# Patient Record
Sex: MALE | Race: BLACK OR AFRICAN AMERICAN | NOT HISPANIC OR LATINO | Employment: FULL TIME | URBAN - METROPOLITAN AREA
[De-identification: names, ages, dates, MRNs, and addresses within clinical notes are randomized per-mention and may not be internally consistent; named-entity substitution may affect disease eponyms.]

---

## 2018-02-02 ENCOUNTER — OFFICE VISIT (OUTPATIENT)
Dept: INTERNAL MEDICINE | Facility: CLINIC | Age: 33
End: 2018-02-02
Payer: COMMERCIAL

## 2018-02-02 ENCOUNTER — TELEPHONE (OUTPATIENT)
Dept: INTERNAL MEDICINE | Facility: CLINIC | Age: 33
End: 2018-02-02

## 2018-02-02 VITALS
HEIGHT: 67 IN | SYSTOLIC BLOOD PRESSURE: 120 MMHG | HEART RATE: 82 BPM | WEIGHT: 153.44 LBS | DIASTOLIC BLOOD PRESSURE: 72 MMHG | OXYGEN SATURATION: 99 % | TEMPERATURE: 99 F | BODY MASS INDEX: 24.08 KG/M2

## 2018-02-02 DIAGNOSIS — R68.89 FLU-LIKE SYMPTOMS: ICD-10-CM

## 2018-02-02 DIAGNOSIS — J20.9 BRONCHITIS, ACUTE, WITH BRONCHOSPASM: Primary | ICD-10-CM

## 2018-02-02 LAB
FLUAV AG SPEC QL IA: NEGATIVE
FLUBV AG SPEC QL IA: NEGATIVE
SPECIMEN SOURCE: NORMAL

## 2018-02-02 PROCEDURE — 99999 PR PBB SHADOW E&M-EST. PATIENT-LVL III: CPT | Mod: PBBFAC,,, | Performed by: FAMILY MEDICINE

## 2018-02-02 PROCEDURE — 3008F BODY MASS INDEX DOCD: CPT | Mod: S$GLB,,, | Performed by: FAMILY MEDICINE

## 2018-02-02 PROCEDURE — 87400 INFLUENZA A/B EACH AG IA: CPT | Mod: PO

## 2018-02-02 PROCEDURE — 99214 OFFICE O/P EST MOD 30 MIN: CPT | Mod: S$GLB,,, | Performed by: FAMILY MEDICINE

## 2018-02-02 RX ORDER — DOXYCYCLINE 100 MG/1
100 CAPSULE ORAL 2 TIMES DAILY
Qty: 20 CAPSULE | Refills: 0 | Status: SHIPPED | OUTPATIENT
Start: 2018-02-02 | End: 2018-02-12

## 2018-02-02 RX ORDER — ALBUTEROL SULFATE 90 UG/1
2 AEROSOL, METERED RESPIRATORY (INHALATION) EVERY 6 HOURS PRN
Qty: 1 INHALER | Refills: 0 | Status: SHIPPED | OUTPATIENT
Start: 2018-02-02 | End: 2018-02-08 | Stop reason: CLARIF

## 2018-02-02 NOTE — TELEPHONE ENCOUNTER
----- Message from Marvin Mcintyre MD sent at 2/2/2018 11:55 AM CST -----  Influenza testing negative.  Sent additional prescription for antibiotic to his pharmacy.

## 2018-02-02 NOTE — PROGRESS NOTES
Subjective:   Patient ID: Desean Kent is a 33 y.o. male.  Chief Complaint:  Cough; Shortness of Breath; Generalized Body Aches; and Fever    Cough   This is a new problem. The current episode started in the past 7 days. The problem has been unchanged. The problem occurs every few minutes. The cough is non-productive. Associated symptoms include chills, a fever, myalgias, nasal congestion, postnasal drip, rhinorrhea and shortness of breath. Pertinent negatives include no chest pain, ear congestion, ear pain, eye redness, headaches, heartburn, hemoptysis, rash, sore throat, sweats, weight loss or wheezing. Nothing aggravates the symptoms. He has tried nothing for the symptoms. There is no history of asthma, bronchitis, environmental allergies or pneumonia.   Influenza   The current episode started in the past 7 days. The problem occurs constantly. The problem has been gradually worsening. Associated symptoms include chills, congestion, coughing, diaphoresis, a fever, myalgias and nausea. Pertinent negatives include no abdominal pain, anorexia, arthralgias, change in bowel habit, chest pain, fatigue, headaches, joint swelling, neck pain, numbness, rash, sore throat, swollen glands, urinary symptoms, vertigo, visual change, vomiting or weakness. Nothing aggravates the symptoms. He has tried acetaminophen and NSAIDs for the symptoms. The treatment provided moderate relief.       Current Outpatient Prescriptions:     albuterol 90 mcg/actuation inhaler, Inhale 2 puffs into the lungs every 6 (six) hours as needed for Wheezing or Shortness of Breath (or Cough)., Disp: 1 Inhaler, Rfl: 0    doxycycline (VIBRAMYCIN) 100 MG Cap, Take 1 capsule (100 mg total) by mouth 2 (two) times daily., Disp: 20 capsule, Rfl: 0     Review of Systems   Constitutional: Positive for chills, diaphoresis and fever. Negative for fatigue and weight loss.   HENT: Positive for congestion, postnasal drip and rhinorrhea. Negative for ear discharge,  "ear pain, sinus pain, sinus pressure, sneezing, sore throat, trouble swallowing and voice change.    Eyes: Negative for discharge, redness and itching.   Respiratory: Positive for cough and shortness of breath. Negative for hemoptysis, choking, chest tightness, wheezing and stridor.    Cardiovascular: Negative for chest pain, palpitations and leg swelling.   Gastrointestinal: Positive for nausea. Negative for abdominal pain, anorexia, change in bowel habit, diarrhea, heartburn and vomiting.   Musculoskeletal: Positive for myalgias. Negative for arthralgias, joint swelling and neck pain.   Skin: Negative for rash.   Allergic/Immunologic: Negative for environmental allergies.   Neurological: Negative for dizziness, vertigo, weakness, numbness and headaches.   Hematological: Negative for adenopathy.     Objective:   /72 (BP Location: Right arm, Patient Position: Sitting, BP Method: Large (Manual))   Pulse 82   Temp 98.8 °F (37.1 °C) (Tympanic)   Ht 5' 7" (1.702 m)   Wt 69.6 kg (153 lb 7 oz)   SpO2 99%   BMI 24.03 kg/m²     Physical Exam   Constitutional: Vital signs are normal. He appears well-developed and well-nourished.  Non-toxic appearance. He does not have a sickly appearance. He appears ill. No distress.   HENT:   Right Ear: Hearing, tympanic membrane, external ear and ear canal normal.   Left Ear: Hearing, tympanic membrane, external ear and ear canal normal.   Nose: Mucosal edema and rhinorrhea present. Right sinus exhibits no maxillary sinus tenderness and no frontal sinus tenderness. Left sinus exhibits no maxillary sinus tenderness and no frontal sinus tenderness.   Mouth/Throat: Uvula is midline and mucous membranes are normal. Posterior oropharyngeal edema and posterior oropharyngeal erythema present. No oropharyngeal exudate or tonsillar abscesses. Tonsils are 2+ on the right. Tonsils are 2+ on the left. No tonsillar exudate.   Eyes: Conjunctivae and lids are normal. Right eye exhibits no " discharge and no exudate. Left eye exhibits no discharge and no exudate.   Neck: Neck supple.   Cardiovascular: Normal rate, regular rhythm and normal heart sounds.  Exam reveals no gallop and no friction rub.    No murmur heard.  Pulmonary/Chest: Effort normal. No respiratory distress. He has no decreased breath sounds. He has no wheezes. He has rhonchi in the right middle field, the right lower field, the left middle field and the left lower field. He has no rales.   Abdominal: Soft. He exhibits no distension. There is no tenderness.   Lymphadenopathy:     He has no cervical adenopathy.   Skin: Skin is warm, dry and intact. No rash noted.   Psychiatric: He has a normal mood and affect.   Nursing note and vitals reviewed.    Assessment:     1. Bronchitis, acute, with bronchospasm    2. Flu-like symptoms      Plan:   Bronchitis, acute, with bronchospasm  Flu-like symptoms  -     doxycycline (VIBRAMYCIN) 100 MG Cap; Take 1 capsule (100 mg total) by mouth 2 (two) times daily.  Dispense: 20 capsule; Refill: 0  -     Influenza antigen Nasopharyngeal Swab  -     albuterol 90 mcg/actuation inhaler; Inhale 2 puffs into the lungs every 6 (six) hours as needed for Wheezing or Shortness of Breath (or Cough).  Dispense: 1 Inhaler; Refill: 0    Influenza testing negative.  Symptoms 7-10+ days.  Gradual worsening.  Treat with doxycycline for atypical bronchitis.  Albuterol for symptoms.  Tylenol/Motrin for fever or aches and pains.    Follow-up with PCP as scheduled/needed.

## 2018-02-07 ENCOUNTER — TELEPHONE (OUTPATIENT)
Dept: INTERNAL MEDICINE | Facility: CLINIC | Age: 33
End: 2018-02-07

## 2018-02-08 RX ORDER — PROMETHAZINE HYDROCHLORIDE AND DEXTROMETHORPHAN HYDROBROMIDE 6.25; 15 MG/5ML; MG/5ML
5 SYRUP ORAL EVERY 6 HOURS PRN
Qty: 180 ML | Refills: 0 | Status: SHIPPED | OUTPATIENT
Start: 2018-02-08 | End: 2018-02-18

## 2019-09-24 ENCOUNTER — OFFICE VISIT (OUTPATIENT)
Dept: INTERNAL MEDICINE | Facility: CLINIC | Age: 34
End: 2019-09-24
Payer: COMMERCIAL

## 2019-09-24 VITALS
HEIGHT: 67 IN | WEIGHT: 146.19 LBS | OXYGEN SATURATION: 99 % | TEMPERATURE: 97 F | BODY MASS INDEX: 22.94 KG/M2 | DIASTOLIC BLOOD PRESSURE: 78 MMHG | HEART RATE: 72 BPM | SYSTOLIC BLOOD PRESSURE: 124 MMHG

## 2019-09-24 DIAGNOSIS — M54.50 ACUTE BILATERAL LOW BACK PAIN WITHOUT SCIATICA: Primary | ICD-10-CM

## 2019-09-24 PROCEDURE — 3008F PR BODY MASS INDEX (BMI) DOCUMENTED: ICD-10-PCS | Mod: CPTII,S$GLB,, | Performed by: PHYSICIAN ASSISTANT

## 2019-09-24 PROCEDURE — 99214 PR OFFICE/OUTPT VISIT, EST, LEVL IV, 30-39 MIN: ICD-10-PCS | Mod: 25,S$GLB,, | Performed by: PHYSICIAN ASSISTANT

## 2019-09-24 PROCEDURE — 96372 THER/PROPH/DIAG INJ SC/IM: CPT | Mod: S$GLB,,, | Performed by: PHYSICIAN ASSISTANT

## 2019-09-24 PROCEDURE — 99214 OFFICE O/P EST MOD 30 MIN: CPT | Mod: 25,S$GLB,, | Performed by: PHYSICIAN ASSISTANT

## 2019-09-24 PROCEDURE — 96372 PR INJECTION,THERAP/PROPH/DIAG2ST, IM OR SUBCUT: ICD-10-PCS | Mod: S$GLB,,, | Performed by: PHYSICIAN ASSISTANT

## 2019-09-24 PROCEDURE — 3008F BODY MASS INDEX DOCD: CPT | Mod: CPTII,S$GLB,, | Performed by: PHYSICIAN ASSISTANT

## 2019-09-24 PROCEDURE — 99999 PR PBB SHADOW E&M-EST. PATIENT-LVL III: CPT | Mod: PBBFAC,,, | Performed by: PHYSICIAN ASSISTANT

## 2019-09-24 PROCEDURE — 99999 PR PBB SHADOW E&M-EST. PATIENT-LVL III: ICD-10-PCS | Mod: PBBFAC,,, | Performed by: PHYSICIAN ASSISTANT

## 2019-09-24 RX ORDER — KETOROLAC TROMETHAMINE 30 MG/ML
60 INJECTION, SOLUTION INTRAMUSCULAR; INTRAVENOUS
Status: COMPLETED | OUTPATIENT
Start: 2019-09-24 | End: 2019-09-24

## 2019-09-24 RX ORDER — NAPROXEN 500 MG/1
500 TABLET ORAL 2 TIMES DAILY WITH MEALS
Qty: 14 TABLET | Refills: 0 | Status: SHIPPED | OUTPATIENT
Start: 2019-09-24 | End: 2019-10-01

## 2019-09-24 RX ADMIN — KETOROLAC TROMETHAMINE 60 MG: 30 INJECTION, SOLUTION INTRAMUSCULAR; INTRAVENOUS at 09:09

## 2019-09-24 NOTE — PROGRESS NOTES
Subjective:      Patient ID: Desean Kent is a 34 y.o. male.    Chief Complaint: Back Pain (lower x 2wks)    Back Pain   This is a new problem. Episode onset: 2 weeks. The problem occurs intermittently. The problem has been waxing and waning since onset. The quality of the pain is described as aching. The pain does not radiate. The pain is moderate. The pain is worse during the night. The symptoms are aggravated by sitting and lying down. Stiffness is present in the morning. Pertinent negatives include no abdominal pain, bladder incontinence, bowel incontinence, chest pain, dysuria, fever, headaches, leg pain, numbness, paresis, paresthesias, pelvic pain, perianal numbness, tingling, weakness or weight loss. He has tried NSAIDs and heat for the symptoms. The treatment provided mild relief.       Review of Systems   Constitutional: Negative for activity change, appetite change, chills, diaphoresis, fatigue, fever, unexpected weight change and weight loss.   HENT: Negative.  Negative for congestion, hearing loss, postnasal drip, rhinorrhea, sore throat, trouble swallowing and voice change.    Eyes: Negative.  Negative for visual disturbance.   Respiratory: Negative.  Negative for cough, choking, chest tightness and shortness of breath.    Cardiovascular: Negative for chest pain, palpitations and leg swelling.   Gastrointestinal: Negative for abdominal distention, abdominal pain, blood in stool, bowel incontinence, constipation, diarrhea, nausea and vomiting.   Endocrine: Negative for cold intolerance, heat intolerance, polydipsia and polyuria.   Genitourinary: Negative.  Negative for bladder incontinence, difficulty urinating, dysuria, frequency and pelvic pain.   Musculoskeletal: Positive for back pain. Negative for arthralgias, gait problem, joint swelling and myalgias.   Skin: Negative for color change, pallor, rash and wound.   Neurological: Negative for dizziness, tingling, tremors, weakness, light-headedness,  "numbness, headaches and paresthesias.   Hematological: Negative for adenopathy.   Psychiatric/Behavioral: Negative for behavioral problems, confusion, self-injury, sleep disturbance and suicidal ideas. The patient is not nervous/anxious.      Objective:   /78 (BP Location: Left arm, Patient Position: Sitting, BP Method: Medium (Manual))   Pulse 72   Temp 96.7 °F (35.9 °C) (Tympanic)   Ht 5' 7" (1.702 m)   Wt 66.3 kg (146 lb 2.6 oz)   SpO2 99%   BMI 22.89 kg/m²     Physical Exam   Constitutional: He is oriented to person, place, and time. He appears well-developed and well-nourished. No distress.   HENT:   Head: Normocephalic and atraumatic.   Eyes: Pupils are equal, round, and reactive to light. Conjunctivae and EOM are normal.   Cardiovascular: Normal rate, regular rhythm and normal heart sounds. Exam reveals no gallop and no friction rub.   No murmur heard.  Pulmonary/Chest: Effort normal and breath sounds normal. No respiratory distress. He has no wheezes. He has no rales. He exhibits no tenderness.   Musculoskeletal: He exhibits no deformity.        Lumbar back: He exhibits decreased range of motion, tenderness, bony tenderness, pain and spasm. He exhibits no swelling, no edema, no deformity, no laceration and normal pulse.   Neurological: He is alert and oriented to person, place, and time.   Skin: Skin is warm and dry. No rash noted. He is not diaphoretic.   Psychiatric: He has a normal mood and affect. His behavior is normal. Judgment and thought content normal.   Nursing note and vitals reviewed.      Assessment:     1. Acute bilateral low back pain without sciatica      Plan:   Acute bilateral low back pain without sciatica  -     ketorolac injection 60 mg  -     naproxen (NAPROSYN) 500 MG tablet; Take 1 tablet (500 mg total) by mouth 2 (two) times daily with meals. for 7 days  Dispense: 14 tablet; Refill: 0      -start naprosyn tomorrow    -ok to take tylenol. No NSAIDS   Educational handout on " over-the-counter medications and at-home conservative care, pertinent to the patients diagnosis today, was handed to the patient and discussed in detail.    Follow up if symptoms worsen or fail to improve.

## 2019-09-24 NOTE — PATIENT INSTRUCTIONS
Self-Care for Low Back Pain    Most people have low back pain now and then. In many cases, it isnt serious and self-care can help. Sometimes low back pain can be a sign of a bigger problem. Call your healthcare provider if your pain returns often or gets worse over time. For the long-term care of your back, get regular exercise, lose any excess weight and learn good posture.  Take a short rest  Lying down during the day may be beneficial for short periods of time if severe pain increases with sitting or standing. Long-term bed rest could be detrimental.  Reduce pain and swelling  Cold reduces swelling. Both cold and heat can reduce pain. Protect your skin by placing a towel between your body and the ice or heat source.  · For the first few days, apply an ice pack for 15 to 20 minutes .  · After the first few days, try heat for 15 minutes at a time to ease pain. Never sleep on a heating pad.  · Over-the-counter medicine can help control pain and swelling. Try aspirin or ibuprofen.  Exercise  Exercise can help your back heal. It also helps your back get stronger and more flexible, preventing any reinjury. Ask your healthcare provider about specific exercises for your back.  Use good posture to avoid reinjury  · When moving, bend at the hips and knees. Dont bend at the waist or twist around.  · When lifting, keep the object close to your body. Dont try to lift more than you can handle.  · When sitting, keep your lower back supported. Use a rolled-up towel as needed.  Seek immediate medical care if:  · Youre unable to stand or walk.  · You have a temperature over 100.4°F (38.0°C)  · You have frequent, painful, or bloody urination.  · You have severe abdominal pain.  · You have a sharp, stabbing pain.  · Your pain is constant.  · You have pain or numbness in your leg.  · You feel pain in a new area of your back.  · You notice that the pain isnt decreasing after more than a week.   Date Last Reviewed: 9/29/2015  ©  0464-1835 [employer]. [address] [place] [address]. All rights reserved. This information is not intended as a substitute for professional medical care. Always follow your healthcare professional's instructions.        Relieving Back Pain  Back pain is a common problem. You can strain back muscles by lifting too much weight or just by moving the wrong way. Back strain can be uncomfortable, even painful. And it can take weeks or months to improve. To help yourself feel better and prevent future back strains, try these tips.  Important Note: Do not give aspirin to children or teens without first discussing it with your healthcare provider.      ? Ice    Ice reduces muscle pain and swelling. It helps most during the first 24 to 48 hours after an injury.  · Wrap an ice pack or a bag of frozen peas in a thin towel. (Never place ice directly on your skin.)  · Place the ice where your back hurts the most.  · Dont ice for more than 20 minutes at a time.  · You can use ice several times a day.  ? Medicines  Over-the-counter pain relievers can include acetaminophen and anti-inflammatory medicines, which includes aspirin or ibuprofen. They can help ease discomfort. Some also reduce swelling.  · Tell your healthcare provider about any medicines you are already taking.  · Take medicines only as directed.  ? Heat  After the first 48 hours, heat can relax sore muscles and improve blood flow.  · Try a warm bath or shower. Or use a heating pad set on low. To prevent a burn, keep a cloth between you and the heating pad.  · Dont use a heating pad for more than 15 minutes at a time. Never sleep on a heating pad.  Date Last Reviewed: 9/1/2015  © 6069-7950 [employer]. [address] [place] [address]. All rights reserved. This information is not intended as a substitute for professional medical care. Always follow your healthcare professional's instructions.        Back Spasm (No  Trauma)    Spasm of the back muscles can occur after a sudden forceful twisting or bending force (such as in a car accident), after a simple awkward movement, or after lifting something heavy with poor body positioning. In any case, muscle spasm adds to the pain. Sleeping in an awkward position or on a poor quality mattress can also cause this. Some people respond to emotional stress by tensing the muscles of their back.  Pain that continues may need further evaluation or other types of treatment such as physical therapy.  You don't always need X-rays for the initial evaluation of back pain, unless you had a physical injury such as from a car accident or fall. If your pain continues and doesn't respond to medical treatment, X-rays and other tests may then be done.   Home care  · As soon as possible, start sitting or walking again to avoid problems from prolonged bed rest (muscle weakness, worsening back stiffness and pain, blood clots in the legs).  · When in bed, try to find a position of comfort. A firm mattress is best. Try lying flat on your back with pillows under your knees. You can also try lying on your side with your knees bent up toward your chest and a pillow between your knees.  · Avoid prolonged sitting, long car rides, or travel. This puts more stress on the lower back than standing or walking.   · During the first 24 to 72 hours after an injury or flare-up, apply an ice pack to the painful area for 20 minutes, then remove it for 20 minutes. Do this over a period of 60 to 90 minutes or several times a day. This will reduce swelling and pain. Always wrap ice packs in a thin towel.  · You can start with ice, then switch to heat. Heat (hot shower, hot bath, or heating pad) reduces pain, and works well for muscle spasms. Apply heat to the painful area for 20 minutes, then remove it for 20 minutes. Do this over a period of 60 to 90 minutes or several times a day. Do not sleep on a heating pad as it can burn  or damage skin.  · Alternate ice and heat therapies.  · Be aware of safe lifting methods and do not lift anything over 15 pounds until all the pain is gone.  Gentle stretching will help your back heal faster. Do this simple routine 2 to 3 times a day until your back is feeling better.  · Lie on your back with your knees bent and both feet on the ground  · Slowly raise your left knee to your chest as you flatten your lower back against the floor. Hold for 20 to 30 seconds.  · Relax and repeat the exercise with your right knee.  · Do 2 to 3 of these exercises for each leg.  · Repeat, hugging both knees to your chest at the same time.  · Do not bounce, but use a gentle pull.  Medicines  Talk to your doctor before using medicine, especially if you have other medical problems or are taking other medicines.  You may use acetaminophen or ibuprofen to control pain, unless your healthcare provider prescribed another pain medicine. If you have a chronic condition such as diabetes, liver or kidney disease, stomach ulcer, or gastrointestinal bleeding, or are taking blood thinners, talk with your healthcare provider before taking any medicines.  Be careful if you are given prescription pain medicine, narcotics, or medicine for muscle spasm. They can cause drowsiness, affect your coordination, reflexes, or judgment. Do not drive or operate heavy machinery when taking these medicines. Take pain medicine only as prescribed by your healthcare provider.  Follow-up care  Follow up with your doctor, or as advised. Physical therapy or further tests may be needed.  If X-rays were taken, they may be reviewed by a radiologist. You will be notified of any new findings that may affect your care.  Call 911  Seek emergency medical care if any of these occur:  · Trouble breathing  · Confusion  · Drowsiness or trouble awakening  · Fainting or loss of consciousness  · Rapid or very slow heart rate  · Loss of bowel or bladder control  When to seek  medical advice  Call your healthcare provider right away if any of these occur:  · Pain becomes worse or spreads to your legs  · Weakness or numbness in one or both legs  · Numbness in the groin or genital area  · Unexplained fever over 100.4ºF (38.0ºC)  · Burning or pain when passing urine  Date Last Reviewed: 6/1/2016 © 2000-2017 [employer]. [address] [place] [address], [place] [address]. All rights reserved. This information is not intended as a substitute for professional medical care. Always follow your healthcare professional's instructions.        Back Care Tips    Caring for your back  These are things you can do to prevent a recurrence of acute back pain and to reduce symptoms from chronic back pain:  · Maintain a healthy weight. If you are overweight, losing weight will help most types of back pain.  · Exercise is an important part of recovery from most types of back pain. The muscles behind and in front of the spine support the back. This means strengthening both the back muscles and the abdominal muscles will provide better support for your spine.   · Swimming and brisk walking are good overall exercises to improve your fitness level.  · Practice safe lifting methods (below).  · Practice good posture when sitting, standing and walking. Avoid prolonged sitting. This puts more stress on the lower back than standing or walking.  · Wear quality shoes with sufficient arch support. Foot and ankle alignment can affect back symptoms. Women should avoid wearing high heels.  · Therapeutic massage can help relax the back muscles without stretching them.  · During the first 24 to 72 hours after an acute injury or flare-up of chronic back pain, apply an ice pack to the painful area for 20 minutes and then remove it for 20 minutes, over a period of 60 to 90 minutes, or several times a day. As a safety precaution, do not use a heating pad at bedtime. Sleeping on a heating pad can lead to skin burns or tissue  damage.  · You can alternate ice and heat therapies.  Medications  Talk to your healthcare provider before using medicines, especially if you have other medical problems or are taking other medicines.  · You may use acetaminophen or ibuprofen to control pain, unless your healthcare provider prescribed other pain medicine. If you have chronic conditions like diabetes, liver or kidney disease, stomach ulcers, or gastrointestinal bleeding, or are taking blood thinners, talk with your healthcare provider before taking any medicines.  · Be careful if you are given prescription pain medicines, narcotics, or medicine for muscle spasm. They can cause drowsiness, affect your coordination, reflexes, and judgment. Do not drive or operate heavy machinery while taking these types of medicines. Take prescription pain medicine only as prescribed by your healthcare provider.  Lumbar stretch  Here is a simple stretching exercise that will help relax muscle spasm and keep your back more limber. If exercise makes your back pain worse, dont do it.  · Lie on your back with your knees bent and both feet on the ground.  · Slowly raise your left knee to your chest as you flatten your lower back against the floor. Hold for 5 seconds.  · Relax and repeat the exercise with your right knee.  · Do 10 of these exercises for each leg.  Safe lifting method  · Dont bend over at the waist to lift an object off the floor.  Instead, bend your knees and hips in a squat.   · Keep your back and head upright  · Hold the object close to your body, directly in front of you.  · Straighten your legs to lift the object.   · Lower the object to the floor in the reverse fashion.  · If you must slide something across the floor, push it.  Posture tips  Sitting  Sit in chairs with straight backs or low-back support. Keep your knees lower than your hips, with your feet flat on the floor.  When driving, sit up straight. Adjust the seat forward so you are not  leaning toward the steering wheel.  A small pillow or rolled towel behind your lower back may help if you are driving long distances.   Standing  When standing for long periods, shift most of your weight to one leg at a time. Alternate legs every few minutes.   Sleeping  The best way to sleep is on your side with your knees bent. Put a low pillow under your head to support your neck in a neutral spine position. Avoid thick pillows that bend your neck to one side. Put a pillow between your legs to further relax your lower back. If you sleep on your back, put pillows under your knees to support your legs in a slightly flexed position. Use a firm mattress. If your mattress sags, replace it, or use a 1/2-inch plywood board under the mattress to add support.  Follow-up care  Follow up with your healthcare provider, or as advised.  If X-rays, a CT scan or an MRI scan were taken, they will be reviewed by a radiologist. You will be notified of any new findings that may affect your care.  Call 911  Seek emergency medical care if any of the following occur:  · Trouble breathing  · Confusion  · Very drowsy  · Fainting or loss of consciousness  · Rapid or very slow heart rate  · Loss of  bowel or bladder control  When to seek medical care  Call your healthcare provider if any of the following occur:  · Pain becomes worse or spreads to your arms or legs  · Weakness or numbness in one or both arms or legs  · Numbness in the groin area  Date Last Reviewed: 6/1/2016  © 4688-6822 [employer]. [address] [place] [address], [place] [address]. All rights reserved. This information is not intended as a substitute for professional medical care. Always follow your healthcare professional's instructions.        Exercises to Strengthen Your Lower Back  Strong lower back and abdominal muscles work together to support your spine. The exercises below will help strengthen the lower back. It is important that you begin exercising slowly  and increase levels gradually.  Always begin any exercise program with stretching. If you feel pain while doing any of these exercises, stop and talk to your doctor about a more specific exercise program that better suits your condition.   Low back stretch  The point of stretching is to make you more flexible and increase your range of motion. Stretch only as much as you are able. Stretch slowly. Do not push your stretch to the limit. If at any point you feel pain while stretching, this is your (temporary) limit.  · Lie on your back with your knees bent and both feet on the ground.  · Slowly raise your left knee to your chest as you flatten your lower back against the floor. Hold for 5 seconds.  · Relax and repeat the exercise with your right knee.  · Do 10 of these exercises for each leg.  · Repeat hugging both knees to your chest at the same time.  Building lower back strength  Start your exercise routine with 10 to 30 minutes a day, 1 to 3 times a day.  Initial exercises  Lying on your back:  1. Ankle pumps: Move your foot up and down, towards your head, and then away. Repeat 10 times with each foot.  2. Heel slides: Slowly bend your knee, drawing the heel of your foot towards you. Then slide your heel/foot from you, straightening your knee. Do not lift your foot off the floor (this is not a leg lift).  3. Abdominal contraction: Bend your knees and put your hands on your stomach. Tighten your stomach muscles. Hold for 5 seconds, then relax. Repeat 10 times.  4. Straight leg raise: Bend one leg at the knee and keep the other leg straight. Tighten your stomach muscles. Slowly lift your straight leg 6 to 12 inches off the floor and hold for up to 5 seconds. Repeat 10 times on each side.  Standin. Wall squats: Stand with your back against the wall. Move your feet about 12 inches away from the wall. Tighten your stomach muscles, and slowly bend your knees until they are at about a 45 degree angle. Do not go down  too far. Hold about 5 seconds. Then slowly return to your starting position. Repeat 10 times.  2. Heel raises: Stand facing the wall. Slowly raise the heels of your feet up and down, while keeping your toes on the floor. If you have trouble balancing, you can touch the wall with your hands. Repeat 10 times.  More advanced exercises  When you feel comfortable enough, try these exercises.  1. Kneeling lumbar extension: Begin on your hands and knees. At the same time, raise and straighten your right arm and left leg until they are parallel to the ground. Hold for 2 seconds and come back slowly to a starting position. Repeat with left arm and right leg, alternating 10 times.  2. Prone lumbar extension: Lie face down, arms extended overhead, palms on the floor. At the same time, raise your right arm and left leg as high as comfortably possible. Hold for 10 seconds and slowly return to start. Repeat with left arm and right leg, alternating 10 times. Gradually build up to 20 times. (Advanced: Repeat this exercise raising both arms and both legs a few inches off the floor at the same time. Hold for 5 seconds and release.)  3. Pelvic tilt: Lie on the floor on your back with your knees bent at 90 degrees. Your feet should be flat on the floor. Inhale, exhale, then slowly contract your abdominal muscles bringing your navel toward your spine. Let your pelvis rock back until your lower back is flat on the floor. Hold for 10 seconds while breathing smoothly.  4. Abdominal crunch: Perform a pelvic tilt (above) flattening your lower back against the floor. Holding the tension in your abdominal muscles, take another breath and raise your shoulder blades off the ground (this is not a full sit-up). Keep your head in line with your body (dont bend your neck forward). Hold for 2 seconds, then slowly lower.  Date Last Reviewed: 6/1/2016  © 6750-2491 [employer]. [address] [place] [address], [place] [address]. All rights  reserved. This information is not intended as a substitute for professional medical care. Always follow your healthcare professional's instructions.

## 2019-12-16 ENCOUNTER — HOSPITAL ENCOUNTER (OUTPATIENT)
Dept: RADIOLOGY | Facility: HOSPITAL | Age: 34
Discharge: HOME | End: 2019-12-16
Attending: PHYSICAL MEDICINE & REHABILITATION
Payer: COMMERCIAL

## 2019-12-16 ENCOUNTER — OFFICE VISIT (OUTPATIENT)
Dept: PHYSICAL MEDICINE AND REHAB | Facility: CLINIC | Age: 34
End: 2019-12-16
Payer: COMMERCIAL

## 2019-12-16 VITALS
HEIGHT: 67 IN | DIASTOLIC BLOOD PRESSURE: 90 MMHG | WEIGHT: 146.19 LBS | HEART RATE: 56 BPM | BODY MASS INDEX: 22.94 KG/M2 | SYSTOLIC BLOOD PRESSURE: 135 MMHG

## 2019-12-16 DIAGNOSIS — M84.374A STRESS FRACTURE OF RIGHT FOOT, INITIAL ENCOUNTER: ICD-10-CM

## 2019-12-16 DIAGNOSIS — M79.671 RIGHT FOOT PAIN: Primary | ICD-10-CM

## 2019-12-16 DIAGNOSIS — M79.671 RIGHT FOOT PAIN: ICD-10-CM

## 2019-12-16 DIAGNOSIS — S93.401A MILD SPRAIN OF RIGHT ANKLE, INITIAL ENCOUNTER: ICD-10-CM

## 2019-12-16 PROCEDURE — 3008F BODY MASS INDEX DOCD: CPT | Mod: CPTII,S$GLB,, | Performed by: PHYSICAL MEDICINE & REHABILITATION

## 2019-12-16 PROCEDURE — 99204 PR OFFICE/OUTPT VISIT, NEW, LEVL IV, 45-59 MIN: ICD-10-PCS | Mod: S$GLB,,, | Performed by: PHYSICAL MEDICINE & REHABILITATION

## 2019-12-16 PROCEDURE — 73630 XR FOOT COMPLETE 3 VIEW RIGHT: ICD-10-PCS | Mod: 26,RT,, | Performed by: RADIOLOGY

## 2019-12-16 PROCEDURE — 3008F PR BODY MASS INDEX (BMI) DOCUMENTED: ICD-10-PCS | Mod: CPTII,S$GLB,, | Performed by: PHYSICAL MEDICINE & REHABILITATION

## 2019-12-16 PROCEDURE — 99204 OFFICE O/P NEW MOD 45 MIN: CPT | Mod: S$GLB,,, | Performed by: PHYSICAL MEDICINE & REHABILITATION

## 2019-12-16 PROCEDURE — 99999 PR PBB SHADOW E&M-EST. PATIENT-LVL III: ICD-10-PCS | Mod: PBBFAC,,, | Performed by: PHYSICAL MEDICINE & REHABILITATION

## 2019-12-16 PROCEDURE — 73630 X-RAY EXAM OF FOOT: CPT | Mod: 26,RT,, | Performed by: RADIOLOGY

## 2019-12-16 PROCEDURE — 73630 X-RAY EXAM OF FOOT: CPT | Mod: TC,RT

## 2019-12-16 PROCEDURE — 99999 PR PBB SHADOW E&M-EST. PATIENT-LVL III: CPT | Mod: PBBFAC,,, | Performed by: PHYSICAL MEDICINE & REHABILITATION

## 2019-12-16 NOTE — PATIENT INSTRUCTIONS
[email or web address]    [email or web address]     [email or web address] on instagram      Toe Yoga - The purpose is to give you a smarter, stronger, and more stable foot. The big toe accounts for about 85% of our stability, get it stronger! Start with coordination:  1. Keeping little toes down, lift the big toe.  2. Put big toe down, lift the small toes.  3. Alternate for 20 - 30 seconds before taking a break.  4. Progress from doing this sitting to standing to single leg stance.        For Foot Strength:  1. Pick the little toes up, drive the big toe down without curling it.  2. Gradually hold this squeezing for longer and longer.  3. Again progress from sitting to standing to single leg stance.  4. Start incorporating this with standing exercises like squats and deadlifts and all single leg work.     For a good primer, check out this link - Are You Ready to go Minimal: https://www.[employer].com/watch?v=YtICeFOKjIs    Working through these progressions:    Unless otherwise stated, you should only be doing one exercise from each progression listed below. These are listed in order of difficulty, so start with the first one in each list. Do as many reps as you can while maintaining excellent form. Stop doing the exercise if you fatigue or can't maintain proper form. Once you can do the recommended amount of reps for that exercise, stop doing that one and move on to the next exercise in that progression list during your next workout.     Lower Leg Progression:    1. Double leg heel raises: 3 x 20 (besides just picking the heel up, make sure you also invert it, or turn it in, as you come up.)    2. Single leg heel raise: 3 x 15 (same as above, the heel should invert as you come up.)    3. Bonus level: Eccentric calf raises: 3 x 15 - not sure if it helps tib post as much, but can give you bigger stronger Achilleswhich is  essentially a big spring giving you free energy.      Balance Exercises:    1. Single leg kettlebell press: 3 x 10 - 15      2. Single Leg Kettleball Swap: 3 x 30 seconds: See this link  https://[email or web address].com/[email or web address]?v=[email or web address]

## 2019-12-16 NOTE — PROGRESS NOTES
PM&R NEW PATIENT HISTORY & PHYSICAL:    Referring Physician: Cornelius Cannon MD    Chief Complaint   Patient presents with    Foot Pain     Right foot pain from injury on 11/18/2019       HPI: This is a 34 y.o.  male being seen in clinic today for evaluation of Foot Pain (Right foot pain from injury on 11/18/2019)   The problem first began about one month ago. Had inversion sprain to right ankle playing indoor soccer. Was able to keep playing on it. Swelling subsided over a few days and pain over a few weeks. He feels dull and aching pain in his right lateral foot. Worsens throughout the day and can wake him up at night. The symptoms show no change. He has tried occasional IBU with temporary improvement. He guesses he has sprained the right ankle at least 20 times. He has not tried physical therapy. Lives in  and works in [place].     History obtained from patient.    Past family, medical, social, surgical history, and vital signs reviewed in chart.    Review of Systems   Constitutional: Negative for chills, fever and weight loss.   HENT: Negative for hearing loss and sore throat.    Eyes: Negative for blurred vision, photophobia and pain.   Respiratory: Negative for shortness of breath.    Cardiovascular: Negative for chest pain.   Gastrointestinal: Negative for abdominal pain.   Genitourinary: Negative for dysuria.   Skin: Negative for rash.   Neurological: Negative for tingling and headaches.   Endo/Heme/Allergies: Does not bruise/bleed easily.   Psychiatric/Behavioral: Negative for depression.       General    Nursing note and vitals reviewed.  Constitutional: He is oriented to person, place, and time. He appears well-developed and well-nourished.   HENT:   Head: Normocephalic and atraumatic.   Eyes: Conjunctivae and EOM are normal. Pupils are equal, round, and reactive to light.   Neck: Neck supple.   Cardiovascular: Intact distal pulses.    Pulmonary/Chest: Effort normal. No respiratory distress.    Abdominal: He exhibits no distension.   Neurological: He is alert and oriented to person, place, and time. He has normal reflexes.   Psychiatric: He has a normal mood and affect.     General Musculoskeletal Exam   Gait: normal     Right Ankle/Foot Exam     Inspection   Deformity: absent  Effusion: Ankle - absent Foot - present (small)    Range of Motion   Ankle Joint   Dorsiflexion: normal   Plantar flexion: normal   Subtalar Joint   Inversion: normal   Eversion: normal     Alignment   Hindfoot Alignment: neutral  Midfoot Alignment: flexible planus  Forefoot Alignment: supinated    Muscle Strength   The patient has normal right ankle strength.    Tests   Anterior drawer: negative  Varus tilt: negative  Squeeze Test: negative    Other   Sensation: normal    Comments:  Does poorly with toe yoga.  TTP near insertion of peroneus brevis and under fifth met base. Poor balance. Pes planus and drifts toward outside of foot.     Left Ankle/Foot Exam   Left ankle exam is normal.    Inspection  Deformity: absent  Effusion: Ankle - absent     Range of Motion   Ankle Joint  Dorsiflexion: normal   Plantar flexion: normal     Subtalar Joint   Inversion: normal   Eversion: normal   First MTP Joint: normal    Alignment   Hindfoot Alignment: neutral  Forefoot Alignment: normal    Muscle Strength   The patient has normal left ankle strength.    Tests   Anterior drawer: negative  Varus tilt: negative  Heel Walk: able to perform  Tiptoe Walk: able to perform  Squeeze Test: absent    Other   Sensation: normal    Comments:  Does poorly with toe yoga      Reflexes     Right Side   Achilles:  2+    Vascular Exam     Right Pulses  Dorsalis Pedis:      2+          Left Pulses  Dorsalis Pedis:      2+              IMPRESSION/PLAN: Desean is a 34 y.o.  male with:    1. Right foot pain  - X-Ray Foot Complete Right; Future  - Ambulatory referral to Physical Therapy    2. Mild sprain of right ankle, initial encounter  - X-Ray Foot Complete  Right; Future  - Ambulatory referral to Physical Therapy    3. Stress fracture of right foot, initial encounter  - X-Ray Foot Complete Right; Future  - Ambulatory referral to Physical Therapy       The findings were discussed with Desean in detail. The location of pain near base of fifth metatarsal makes me more nervous about possible stress reaction/fracture at this area, so we'll at least get x-rays today. If anything suspicious on xray or not improving soon, will get MRI. He was encouraged to reduce his soccer and any other painful activities for now. I explained about the loss of proprioception and chronic ankle instability and why he needs a lot of foot/ankle strengthening and balance work. He'll come here to PT and do his HEP as well. He was given multiple resources to look at as well. All of his questions were answered. He was provided this plan in writing. He will follow-up with me in 6 weeks.     Jodie Burns M.D.  Physical Medicine and Rehab

## 2019-12-16 NOTE — LETTER
December 16, 2019      Cornelius Cannon MD  [address] [facility]  [place] [address]           [facility]  [address] [facility] [facility] [place] [address]  Phone: [phone]  Fax: [phone]          Patient: Desean Kent   MR Number: [id number]   YOB: 1985   Date of Visit: 12/16/2019       Dear Dr. Cornelius Cannon:    Thank you for referring Desean Kent to me for evaluation. Attached you will find relevant portions of my assessment and plan of care.    If you have questions, please do not hesitate to call me. I look forward to following Desean Kent along with you.    Sincerely,    Jodie Burns MD    Enclosure  CC:  No Recipients    If you would like to receive this communication electronically, please contact [email or web address] or [phone] to request more information on [employer] access.    For providers and/or their staff who would like to refer a patient to [facility], please contact us through our one-stop-shop provider referral line, [facility], at [phone].    If you feel you have received this communication in error or would no longer like to receive these types of communications, please e-mail [email or web address]

## 2019-12-24 ENCOUNTER — CLINICAL SUPPORT (OUTPATIENT)
Dept: REHABILITATION | Facility: HOSPITAL | Age: 34
End: 2019-12-24
Payer: COMMERCIAL

## 2019-12-24 DIAGNOSIS — M79.671 RIGHT FOOT PAIN: ICD-10-CM

## 2019-12-24 PROCEDURE — 97110 THERAPEUTIC EXERCISES: CPT | Performed by: PHYSICAL THERAPIST

## 2019-12-24 PROCEDURE — 97161 PT EVAL LOW COMPLEX 20 MIN: CPT | Performed by: PHYSICAL THERAPIST

## 2019-12-24 NOTE — PLAN OF CARE
[facility]  Physical Therapy Initial Evaluation    Name: Desean Kent  Clinic Number: [id number]    Therapy Diagnosis:   Encounter Diagnosis   Name Primary?    Right foot pain      Physician: Jodie Burns MD    Physician Orders: PT Eval and Treat  Medical Diagnosis from Referral: right foot pain, chronic ankle sprains   Evaluation Date: 12/24/2019  Authorization Period Expiration: 12/15/2020  Plan of Care Expiration: 3/23/2020  Visit # / Visits authorized: 1/ 24    Precautions: none    Time In: 11:00am  Time Out: 11:50am  Total Billable Time: 50 minutes    SUBJECTIVE     Date of onset: About a month and a half ago    History of current condition - Desean is a 34 y.o. male whom reports that he rolled his right ankle about a month and a half ago while playing soccer. He states that he did experience some swelling following the injury but no bruising. Patient reports that once the swelling went down, his pain was more so located on the outside of the foot. X-rays were taken, but did not reveal any fractures. Patient reports that he may have a history of ankle sprains, but nothing requiring him to see a doctor. Patient reports that his foot is not getting worse, but it is also not getting any better. He states that he starts to feel pain in the foot while running, and then he usually feels a dull ache the day after.      Medical History:   No past medical history on file.    Surgical History:   Desean Kent  has no past surgical history on file.    Medications:   Desean currently has no medications in their medication list.    Allergies:   Review of patient's allergies indicates:  No Known Allergies     Imaging: x-ray on 12/16/2019, of right foot  FINDINGS:  Pes planus.  Minimal degenerative change at the talonavicular joint.  Small well corticated density projects along the posterior distal margin of the tibia at the tibial talar joint.  No acute fracture or dislocation.  No soft tissue  swelling, additional calcification or foreign body.    Prior Therapy: N/A  Social History: Pt lives alone  Occupation: Pt is a .   Prior Level of Function: Patient was able to perform normal ADL and recreational activities without pain or limitation.   Current Level of Function: Patient is unable to perform normal ADL and recreational activities without pain or limitation. He is still able to run and play soccer, but he does so with pain during and following.     Pain:  Current 4/10, worst 7/10, best 2/10   Location: right ankle(s), right foot/feet  Description: Aching, Dull and Sharp  Aggravating Factors: Night Time and running  Easing Factors: hasn't tried anything    Dominant Extremity: Right    Pts goals: Pt reported goals are to return to normal ADL and recreational activities with less pain and limitation. He would like to return to running and playing soccer without any pain.     OBJECTIVE   (x = not tested due to pain and/or inability to obtain test position)      RANGE OF MOTION:   Ankle/Foot ROM: Ankle/foot ROM WNL bilaterally       STRENGTH:    L/E MMT Right  12/24/2019 Left  12/24/2019 Pain/Dysfunction with Movement Goal   Hip Flexion  4+/5 4+/5  5/5 B    Hip Extension  4/5 4/5  5/5 B   Hip Abduction  4+/5 4+/5  5/5 B   Knee Extension 5/5 5/5  5/5 B   Knee Flexion 5/5 5/5  5/5 B   Ankle DF 5/5 5/5  5/5 B   Ankle PF 5/5  9/20 R heel raises 5/5  20/20 L heel raises  5/5 B    20/20 SL heel raises B   Ankle Inversion 4+/5 4+/5  5/5 B   Ankle Eversion 4+/5 4+/5  5/5 B       MUSCLE LENGTH:     Muscle Tested  Right  12/24/2019 Left   12/24/2019 Goal   Hip Flexors  decreased decreased Normal B   Hamstrings  decreased decreased Normal B   Gastrocnemius  decreased decreased Normal B   Soleus  decreased decreased Normal B       JOINT MOBILITY:     Joint Motion Tested Right  (spine)  12/24/2019 Left   12/24/2019 Goal   Talocrural Joint Normal Normal Normal B   Subtalar Joint Normal Normal Normal B        SPECIAL TESTS:     Right  12/24/2019 Left   12/24/2019 Goal   Anterior Drawer  Negative Negative Negative B        Sensation:  Sensation is intact to light touch    Palpation: Moderate TTP near insertion of peroneus brevis and under fifth met base.     Posture:  Pt presents with postural abnormalities which include: pes planus and calcaneal eversion bilaterally    Gait Analysis: The patient ambulated with the following gait abnormalities: ankle/foot pronation    Balance: Patient demonstrates poor SLS balance.       FUNCTION:     LOWER EXTREMITY FUNCTIONAL SCALE  Patient-reported functional assessment scores are rated as follows:  A score of 0/4 represents extreme difficulty or unable to perform the activity. A score of 1/4 represents quite a bit of difficulty. A score of 2/4 represents moderate difficulty. A score of 3/4 represents a little bit of difficulty. A score of 4/4 represents no difficulty.        12/24/2019   1. Any of your usual work, housework or school activities 4/4   2. Your usual hobbies, sporting 4/4   3. Getting in and out of tub 4/4   4. Walking between rooms 4/4   5. Putting on shoes or socks 4/4   6. Squatting 4/4   7. Lifting an object from the ground   4/4   8. Performing light activities around the home 4/4   9. Performing heavy activities around the home 4/4   10. Getting in and out of car 4/4   11. Walking 2 blocks 4/4   12.Walking a mile 3/4   13. Getting up and down 1 flight of stairs 4/4   14. Standing for 1 hour 3/4   15. Sitting for an hour  4/4   16. Running on even ground  3/4   17. Running on uneven ground 3/4   18. Making sharp turns when running fast 2/4   19. Hopping  3/4   20. Rolling over in bed 4/4       Patient reports 91% ability based on score of the Lower Extremity Functional Scale on 12/24/2019..         TREATMENT   Treatment Time In: 11:00am  Treatment Time Out: 11:50am  Total Treatment time separate from Evaluation: 10 minutes    Desean received therapeutic  exercises to develop strength, endurance, ROM, flexibility, posture and core stabilization for 10 minutes including:    Exercise 12/24/2019   HEP established                                 x = exercise details same as prior session      Home Exercises and Patient Education Provided    Education/Self-Care provided:    Patient educated on the impairments noted above and the effects of physical therapy intervention to improve overall condition and QOL.       Written Home Exercises Provided: yes.  Exercises were reviewed and Desean was able to demonstrate them prior to the end of the session.  Desean demonstrated good  understanding of the education provided.     See EMR under Patient Instructions for exercises provided 12/24/2019.    ASSESSMENT   Desean is a 34 y.o. male referred to outpatient Physical Therapy with a medical diagnosis of right foot pain, chronic ankle sprains. Pt presents with impairments including: decreased strength, decreased muscle length, postural abnormalities, gait abnormalities and decreased overall function.    Pt prognosis is Good.   Pt will benefit from skilled outpatient Physical Therapy to address the deficits stated above and in the chart below, provide pt/family education, and to maximize pt's level of independence.     Plan of care discussed with patient: Yes  Pt's spiritual, cultural and educational needs considered and patient is agreeable to the plan of care and goals as stated below:     Anticipated Barriers for therapy: none    Medical Necessity is demonstrated by the following  History  Co-morbidities and personal factors that may impact the plan of care Co-morbidities:   none    Personal Factors:   no deficits     low   Examination  Body Structures and Functions, activity limitations and participation restrictions that may impact the plan of care Body Regions:   lower extremities    Body Systems:    strength  balance  gait  motor control    Participation Restrictions:    Patient is unable to perform normal ADL and recreational activities without pain or limitation. He is still able to run and play soccer, but he does so with pain during and following.    Activity limitations:   Learning and applying knowledge  no deficits    General Tasks and Commands  no deficits    Communication  no deficits    Mobility  no deficits    Self care  no deficits    Domestic Life  no deficits    Interactions/Relationships  no deficits    Life Areas  no deficits    Community and Social Life  community life  recreation and leisure         moderate   Clinical Presentation stable and uncomplicated low   Decision Making/ Complexity Score: low       GOALS:    Short Term Goals:  6 weeks    1. Pain: Pt will demonstrate improved pain by reports of less than or equal to 4/10 worst pain on the verbal rating scale in order to progress toward maximal functional ability and improve QOL.    2. Function: Patient will demonstrate improved function as indicated by a score of greater than or equal to 95% on the Lower Extremity Functional Scale    3. Strength: Patient will improve strength to 50% of stated goals, listed in objective measures above, in order to progress towards independence with functional activities.     4. Gait: Patient will demonstrate improved gait mechanics in order to improve functional mobility, improve quality of life, and decrease risk of further injury or fall.     5. HEP: Patient will demonstrate independence with HEP in order to progress toward functional independence.        Long Term Goals:  12 weeks    1. Pain: Pt will demonstrate improved pain by reports of less than or equal to 2/10 worst pain on the verbal rating scale in order to progress toward maximal functional ability and improve QOL.      2. Function: Patient will demonstrate improved function as indicated by a score of greater than or equal to 100% on the Lower Extremity Functional Scale    3. Strength: Patient will improve  strength to stated goals, listed in objective measures above, in order to improve functional independence and quality of life.    4. Gait: Patient will demonstrate normalized gait mechanics with minimal compensation in order to return to PLOF.    5. Patient will return to normal ADL's, IADL's, community involvement, recreational activities, and work-related activities with less than or equal to 0/10 pain and maximal function.         PLAN   Plan of care Certification: 12/24/2019 to 3/23/2020.    Outpatient Physical Therapy 2 times weekly for 12 weeks to include any combination of the following interventions: dry needling, modalities, electrical stimulation (IFC, Pre-Mod, Attended with Functional Dry Needling), Gait Training, Manual Therapy, Neuromuscular Re-ed, Patient Education, Self Care, Therapeutic Activites and Therapeutic Exercise     Thank you for this referral.    These services are reasonable and necessary for the conditions set forth above while under my care.    Leann Pizarro, PT, DPT

## 2020-03-24 ENCOUNTER — DOCUMENTATION ONLY (OUTPATIENT)
Dept: REHABILITATION | Facility: HOSPITAL | Age: 35
End: 2020-03-24

## 2020-03-24 PROBLEM — M79.671 RIGHT FOOT PAIN: Status: RESOLVED | Noted: 2019-12-24 | Resolved: 2020-03-24

## 2020-03-24 NOTE — PROGRESS NOTES
Outpatient Therapy Discharge Summary     Name: Desean Kent  Clinic Number: [id number]    Therapy Diagnosis:        Encounter Diagnosis   Name Primary?    Right foot pain        Physician: Jodie Burns MD    Physician Orders: PT Eval and Treat  Medical Diagnosis from Referral: right foot pain, chronic ankle sprains   Evaluation Date: 12/24/2019      Date of Last visit: 12/24/2019  Total Visits Received: 1  Cancelled Visits: 0  No Show Visits: 0    Assessment      Patient never returned for additional visits following initial evaluation. He is considered DC at this time.     GOALS:     Short Term Goals:  6 weeks     1. Pain: Pt will demonstrate improved pain by reports of less than or equal to 4/10 worst pain on the verbal rating scale in order to progress toward maximal functional ability and improve QOL.     2. Function: Patient will demonstrate improved function as indicated by a score of greater than or equal to 95% on the Lower Extremity Functional Scale     3. Strength: Patient will improve strength to 50% of stated goals, listed in objective measures above, in order to progress towards independence with functional activities.      4. Gait: Patient will demonstrate improved gait mechanics in order to improve functional mobility, improve quality of life, and decrease risk of further injury or fall.      5. HEP: Patient will demonstrate independence with HEP in order to progress toward functional independence.           Long Term Goals:  12 weeks     1. Pain: Pt will demonstrate improved pain by reports of less than or equal to 2/10 worst pain on the verbal rating scale in order to progress toward maximal functional ability and improve QOL.       2. Function: Patient will demonstrate improved function as indicated by a score of greater than or equal to 100% on the Lower Extremity Functional Scale     3. Strength: Patient will improve strength to stated goals, listed in objective measures above, in order to  improve functional independence and quality of life.     4. Gait: Patient will demonstrate normalized gait mechanics with minimal compensation in order to return to PLOF.     5. Patient will return to normal ADL's, IADL's, community involvement, recreational activities, and work-related activities with less than or equal to 0/10 pain and maximal function.             Discharge reason: Patient has not attended therapy since 12/24/2019    Plan   This patient is discharged from Physical Therapy

## 2021-04-28 ENCOUNTER — PATIENT MESSAGE (OUTPATIENT)
Dept: RESEARCH | Facility: HOSPITAL | Age: 36
End: 2021-04-28

## 2022-08-16 ENCOUNTER — OFFICE VISIT (OUTPATIENT)
Dept: FAMILY MEDICINE | Facility: CLINIC | Age: 37
End: 2022-08-16
Payer: COMMERCIAL

## 2022-08-16 VITALS
OXYGEN SATURATION: 99 % | HEART RATE: 81 BPM | BODY MASS INDEX: 21.41 KG/M2 | DIASTOLIC BLOOD PRESSURE: 81 MMHG | WEIGHT: 136.69 LBS | TEMPERATURE: 97 F | SYSTOLIC BLOOD PRESSURE: 126 MMHG | RESPIRATION RATE: 16 BRPM

## 2022-08-16 DIAGNOSIS — R21 RASH: Primary | ICD-10-CM

## 2022-08-16 DIAGNOSIS — L29.9 ITCHING: ICD-10-CM

## 2022-08-16 PROCEDURE — 3079F DIAST BP 80-89 MM HG: CPT | Mod: CPTII,S$GLB,, | Performed by: FAMILY MEDICINE

## 2022-08-16 PROCEDURE — 3079F PR MOST RECENT DIASTOLIC BLOOD PRESSURE 80-89 MM HG: ICD-10-PCS | Mod: CPTII,S$GLB,, | Performed by: FAMILY MEDICINE

## 2022-08-16 PROCEDURE — 99214 PR OFFICE/OUTPT VISIT, EST, LEVL IV, 30-39 MIN: ICD-10-PCS | Mod: S$GLB,,, | Performed by: FAMILY MEDICINE

## 2022-08-16 PROCEDURE — 3074F SYST BP LT 130 MM HG: CPT | Mod: CPTII,S$GLB,, | Performed by: FAMILY MEDICINE

## 2022-08-16 PROCEDURE — 99999 PR PBB SHADOW E&M-EST. PATIENT-LVL III: ICD-10-PCS | Mod: PBBFAC,,, | Performed by: FAMILY MEDICINE

## 2022-08-16 PROCEDURE — 3008F BODY MASS INDEX DOCD: CPT | Mod: CPTII,S$GLB,, | Performed by: FAMILY MEDICINE

## 2022-08-16 PROCEDURE — 3008F PR BODY MASS INDEX (BMI) DOCUMENTED: ICD-10-PCS | Mod: CPTII,S$GLB,, | Performed by: FAMILY MEDICINE

## 2022-08-16 PROCEDURE — 99214 OFFICE O/P EST MOD 30 MIN: CPT | Mod: S$GLB,,, | Performed by: FAMILY MEDICINE

## 2022-08-16 PROCEDURE — 1159F MED LIST DOCD IN RCRD: CPT | Mod: CPTII,S$GLB,, | Performed by: FAMILY MEDICINE

## 2022-08-16 PROCEDURE — 99999 PR PBB SHADOW E&M-EST. PATIENT-LVL III: CPT | Mod: PBBFAC,,, | Performed by: FAMILY MEDICINE

## 2022-08-16 PROCEDURE — 1159F PR MEDICATION LIST DOCUMENTED IN MEDICAL RECORD: ICD-10-PCS | Mod: CPTII,S$GLB,, | Performed by: FAMILY MEDICINE

## 2022-08-16 PROCEDURE — 3074F PR MOST RECENT SYSTOLIC BLOOD PRESSURE < 130 MM HG: ICD-10-PCS | Mod: CPTII,S$GLB,, | Performed by: FAMILY MEDICINE

## 2022-08-16 RX ORDER — FAMOTIDINE 20 MG/1
20 TABLET, FILM COATED ORAL 2 TIMES DAILY
Qty: 60 TABLET | Refills: 0 | Status: SHIPPED | OUTPATIENT
Start: 2022-08-16 | End: 2023-08-16

## 2022-08-16 RX ORDER — PREDNISONE 10 MG/1
10 TABLET ORAL DAILY
Qty: 5 TABLET | Refills: 0 | Status: SHIPPED | OUTPATIENT
Start: 2022-08-16 | End: 2022-08-21

## 2022-08-16 RX ORDER — HYDROXYZINE HYDROCHLORIDE 25 MG/1
25 TABLET, FILM COATED ORAL 3 TIMES DAILY PRN
Qty: 60 TABLET | Refills: 0 | Status: SHIPPED | OUTPATIENT
Start: 2022-08-16

## 2022-08-16 NOTE — PROGRESS NOTES
Subjective:      Patient ID: Desean Kent is a 37 y.o. male.    Chief Complaint: No chief complaint on file.    HPI    Patient here today for rash and itching. Started on right side of abdomen and then spread to the rest of his body. 3 days ago. Has taken benadryl that did not help. No allergies that he knows of. Did drink beers from [employer] the day before this started. Denies being out in the yard. Notes that he started a new lotion about 3 weeks ago - has since stopped this. No other new soaps/medication. No other symptoms. Patient is a  - no new chemicals.     No past medical history on file.    No past surgical history on file.    Family History   Problem Relation Age of Onset    Hypertension Mother     Hypertension Father     Asthma Sister     Diabetes Maternal Grandmother        Social History     Socioeconomic History    Marital status: Single   Tobacco Use    Smoking status: Current Every Day Smoker     Types: Cigarettes   Substance and Sexual Activity    Alcohol use: Yes    Drug use: No    Sexual activity: Yes     Partners: Female       Health Maintenance Topics with due status: Not Due       Topic Last Completion Date    Influenza Vaccine Not Due           Review of patient's allergies indicates:  No Known Allergies    Review of Systems   Skin: Positive for itching and rash.       Objective:     Vitals:    08/16/22 0952   BP: 126/81   Pulse: 81   Resp: 16   Temp: 97.4 °F (36.3 °C)     Body mass index is 21.41 kg/m².    Physical Exam  Vitals and nursing note reviewed.   Constitutional:       General: He is not in acute distress.     Appearance: He is well-developed.   HENT:      Head: Normocephalic.   Cardiovascular:      Rate and Rhythm: Normal rate and regular rhythm.      Heart sounds: Normal heart sounds. No murmur heard.  Pulmonary:      Effort: Pulmonary effort is normal. No respiratory distress.      Breath sounds: Normal breath sounds. No wheezing.   Abdominal:      General: Bowel  sounds are normal.      Palpations: Abdomen is soft.      Tenderness: There is no abdominal tenderness.   Skin:     General: Skin is warm and dry.   Neurological:      Mental Status: He is alert and oriented to person, place, and time.      Comments: Large erythematous hives across stomach, chest, groin, back, neck, arms and upper thigh. Scratch marks also associated with this from patient itching. No oral lesions, lip swelling throat swelling.         Assessment and Plan:     Rash  -     Ambulatory referral/consult to Allergy; Future; Expected date: 08/23/2022  -     famotidine (PEPCID) 20 MG tablet; Take 1 tablet (20 mg total) by mouth 2 (two) times daily.  Dispense: 60 tablet; Refill: 0  -     hydrOXYzine HCL (ATARAX) 25 MG tablet; Take 1 tablet (25 mg total) by mouth 3 (three) times daily as needed for Itching.  Dispense: 60 tablet; Refill: 0  -     predniSONE (DELTASONE) 10 MG tablet; Take 1 tablet (10 mg total) by mouth once daily. for 5 days  Dispense: 5 tablet; Refill: 0    Itching  -     Ambulatory referral/consult to Allergy; Future; Expected date: 08/23/2022  -     famotidine (PEPCID) 20 MG tablet; Take 1 tablet (20 mg total) by mouth 2 (two) times daily.  Dispense: 60 tablet; Refill: 0  -     hydrOXYzine HCL (ATARAX) 25 MG tablet; Take 1 tablet (25 mg total) by mouth 3 (three) times daily as needed for Itching.  Dispense: 60 tablet; Refill: 0  -     predniSONE (DELTASONE) 10 MG tablet; Take 1 tablet (10 mg total) by mouth once daily. for 5 days  Dispense: 5 tablet; Refill: 0    above therapy and referral to allergy    Follow up if symptoms worsen or fail to improve.

## 2022-08-22 ENCOUNTER — OFFICE VISIT (OUTPATIENT)
Dept: ALLERGY | Facility: CLINIC | Age: 37
End: 2022-08-22
Payer: COMMERCIAL

## 2022-08-22 VITALS
HEART RATE: 67 BPM | HEIGHT: 67 IN | BODY MASS INDEX: 21.9 KG/M2 | SYSTOLIC BLOOD PRESSURE: 134 MMHG | WEIGHT: 139.56 LBS | DIASTOLIC BLOOD PRESSURE: 78 MMHG | TEMPERATURE: 99 F

## 2022-08-22 DIAGNOSIS — L29.9 ITCHING: ICD-10-CM

## 2022-08-22 DIAGNOSIS — R21 RASH: ICD-10-CM

## 2022-08-22 PROCEDURE — 99999 PR PBB SHADOW E&M-EST. PATIENT-LVL III: CPT | Mod: PBBFAC,,, | Performed by: ALLERGY & IMMUNOLOGY

## 2022-08-22 PROCEDURE — 3008F PR BODY MASS INDEX (BMI) DOCUMENTED: ICD-10-PCS | Mod: CPTII,S$GLB,, | Performed by: ALLERGY & IMMUNOLOGY

## 2022-08-22 PROCEDURE — 3075F SYST BP GE 130 - 139MM HG: CPT | Mod: CPTII,S$GLB,, | Performed by: ALLERGY & IMMUNOLOGY

## 2022-08-22 PROCEDURE — 3008F BODY MASS INDEX DOCD: CPT | Mod: CPTII,S$GLB,, | Performed by: ALLERGY & IMMUNOLOGY

## 2022-08-22 PROCEDURE — 3078F DIAST BP <80 MM HG: CPT | Mod: CPTII,S$GLB,, | Performed by: ALLERGY & IMMUNOLOGY

## 2022-08-22 PROCEDURE — 3075F PR MOST RECENT SYSTOLIC BLOOD PRESS GE 130-139MM HG: ICD-10-PCS | Mod: CPTII,S$GLB,, | Performed by: ALLERGY & IMMUNOLOGY

## 2022-08-22 PROCEDURE — 99204 OFFICE O/P NEW MOD 45 MIN: CPT | Mod: S$GLB,,, | Performed by: ALLERGY & IMMUNOLOGY

## 2022-08-22 PROCEDURE — 99204 PR OFFICE/OUTPT VISIT, NEW, LEVL IV, 45-59 MIN: ICD-10-PCS | Mod: S$GLB,,, | Performed by: ALLERGY & IMMUNOLOGY

## 2022-08-22 PROCEDURE — 1159F MED LIST DOCD IN RCRD: CPT | Mod: CPTII,S$GLB,, | Performed by: ALLERGY & IMMUNOLOGY

## 2022-08-22 PROCEDURE — 99999 PR PBB SHADOW E&M-EST. PATIENT-LVL III: ICD-10-PCS | Mod: PBBFAC,,, | Performed by: ALLERGY & IMMUNOLOGY

## 2022-08-22 PROCEDURE — 3078F PR MOST RECENT DIASTOLIC BLOOD PRESSURE < 80 MM HG: ICD-10-PCS | Mod: CPTII,S$GLB,, | Performed by: ALLERGY & IMMUNOLOGY

## 2022-08-22 PROCEDURE — 1159F PR MEDICATION LIST DOCUMENTED IN MEDICAL RECORD: ICD-10-PCS | Mod: CPTII,S$GLB,, | Performed by: ALLERGY & IMMUNOLOGY

## 2022-08-22 RX ORDER — LEVOCETIRIZINE DIHYDROCHLORIDE 5 MG/1
5 TABLET, FILM COATED ORAL NIGHTLY
Qty: 30 TABLET | Refills: 11 | Status: SHIPPED | OUTPATIENT
Start: 2022-08-22 | End: 2023-08-22

## 2022-08-22 NOTE — PROGRESS NOTES
Subjective:       Patient ID: Desean Kent is a 37 y.o. male.    Referred by Dr. Fernandez    Chief Complaint:  Rash      HPI: 37 year old male complaining of raised lesions/papular, erythematous, itchy rash on his back and abdomen that started a day after he went to a brewery. Rash started 8 days ago. He drank several types of beers, while at the brewery. He saw his PCP and was given steroids. He denies tongue or throat swelling. He denies a history to any other foods or drink. He reports drinking regularly.  He reports drinking beet regularly without having this issue.      Past Medical History:  See above      Family History   Problem Relation Age of Onset    Hypertension Mother     Hypertension Father     Asthma Sister     Diabetes Maternal Grandmother        Current Outpatient Medications on File Prior to Visit   Medication Sig Dispense Refill    famotidine (PEPCID) 20 MG tablet Take 1 tablet (20 mg total) by mouth 2 (two) times daily. 60 tablet 0    hydrOXYzine HCL (ATARAX) 25 MG tablet Take 1 tablet (25 mg total) by mouth 3 (three) times daily as needed for Itching. 60 tablet 0    [] predniSONE (DELTASONE) 10 MG tablet Take 1 tablet (10 mg total) by mouth once daily. for 5 days 5 tablet 0     No current facility-administered medications on file prior to visit.       Review of patient's allergies indicates:  No Known Allergies    Environmental History: Pets in the home: dogs (1). He smokes- pack a week for 15 years.  Review of Systems   Constitutional: Negative for chills and fever.   HENT: Positive for congestion and rhinorrhea.    Eyes: Positive for discharge and itching.   Respiratory: Negative for chest tightness, shortness of breath and wheezing.    Cardiovascular: Negative for chest pain and leg swelling.   Gastrointestinal: Negative for nausea and vomiting.   Endocrine: Negative for cold intolerance and heat intolerance.   Skin: Positive for rash. Negative for wound.   Allergic/Immunologic:  Positive for environmental allergies. Negative for food allergies.   Neurological: Negative for facial asymmetry and speech difficulty.   Hematological: Negative for adenopathy. Does not bruise/bleed easily.   Psychiatric/Behavioral: Negative for behavioral problems and suicidal ideas.        Objective:    Physical Exam  Constitutional:       General: He is not in acute distress.     Appearance: Normal appearance. He is not ill-appearing, toxic-appearing or diaphoretic.   HENT:      Head: Normocephalic and atraumatic.      Right Ear: Tympanic membrane, ear canal and external ear normal. There is no impacted cerumen.      Left Ear: Tympanic membrane, ear canal and external ear normal. There is no impacted cerumen.      Nose: Nose normal. No congestion or rhinorrhea.      Mouth/Throat:      Mouth: Mucous membranes are moist.      Pharynx: No oropharyngeal exudate or posterior oropharyngeal erythema.   Eyes:      General:         Right eye: No discharge.         Left eye: No discharge.      Pupils: Pupils are equal, round, and reactive to light.   Neck:      Thyroid: No thyromegaly.   Cardiovascular:      Rate and Rhythm: Normal rate and regular rhythm.      Heart sounds: Normal heart sounds. No murmur heard.    No friction rub. No gallop.   Pulmonary:      Effort: Pulmonary effort is normal. No respiratory distress.      Breath sounds: Normal breath sounds. No stridor. No wheezing, rhonchi or rales.   Abdominal:      General: Bowel sounds are normal. There is no distension.      Palpations: Abdomen is soft. There is no mass.      Tenderness: There is no abdominal tenderness. There is no guarding or rebound.      Hernia: No hernia is present.   Musculoskeletal:         General: No swelling, tenderness, deformity or signs of injury. Normal range of motion.      Cervical back: Normal range of motion and neck supple. No rigidity or tenderness.      Right lower leg: No edema.      Left lower leg: No edema.    Lymphadenopathy:      Cervical: No cervical adenopathy.   Skin:     General: Skin is warm and dry.      Coloration: Skin is not jaundiced or pale.      Findings: Rash present. No bruising or erythema.      Comments: Erythematous papular rash on the arms and torso   Neurological:      General: No focal deficit present.      Mental Status: He is alert and oriented to person, place, and time.      Motor: No weakness.      Gait: Gait normal.   Psychiatric:         Mood and Affect: Mood normal.         Behavior: Behavior normal.         Thought Content: Thought content normal.         Judgment: Judgment normal.           Assessment:       1. Rash    2. Itching         Plan:       Beer is a difficult allergy to test, as it could be related to sulfites or particular additives and preservatives in the beer, of which no commercial test exist.   He currently eats wheat, oat barley without symptoms and has had beer, since the event without symptoms.    Will schedule for patch testing. Advised that he can not have patch testing, if he has had oral steroids within the last 2-4 weeks    Patch testing scheduled.    DELPHINE KLINE spent a total of 45 minutes on the day of the visit.  This includes face to face time and non-face to face time preparing to see the patient (eg, review of tests), obtaining and/or reviewing separately obtained history, documenting clinical information in the electronic or other health record, independently interpreting results and communicating results to the patient/family/caregiver, or care coordinator.    CC: Dr. Fernandez

## 2022-09-19 ENCOUNTER — OFFICE VISIT (OUTPATIENT)
Dept: ALLERGY | Facility: CLINIC | Age: 37
End: 2022-09-19
Payer: COMMERCIAL

## 2022-09-19 VITALS
WEIGHT: 143.06 LBS | DIASTOLIC BLOOD PRESSURE: 80 MMHG | BODY MASS INDEX: 22.45 KG/M2 | TEMPERATURE: 97 F | HEART RATE: 59 BPM | SYSTOLIC BLOOD PRESSURE: 130 MMHG | HEIGHT: 67 IN

## 2022-09-19 DIAGNOSIS — L30.9 DERMATITIS: Primary | ICD-10-CM

## 2022-09-19 PROCEDURE — 3079F PR MOST RECENT DIASTOLIC BLOOD PRESSURE 80-89 MM HG: ICD-10-PCS | Mod: CPTII,S$GLB,, | Performed by: ALLERGY & IMMUNOLOGY

## 2022-09-19 PROCEDURE — 3075F SYST BP GE 130 - 139MM HG: CPT | Mod: CPTII,S$GLB,, | Performed by: ALLERGY & IMMUNOLOGY

## 2022-09-19 PROCEDURE — 3008F BODY MASS INDEX DOCD: CPT | Mod: CPTII,S$GLB,, | Performed by: ALLERGY & IMMUNOLOGY

## 2022-09-19 PROCEDURE — 3079F DIAST BP 80-89 MM HG: CPT | Mod: CPTII,S$GLB,, | Performed by: ALLERGY & IMMUNOLOGY

## 2022-09-19 PROCEDURE — 1159F PR MEDICATION LIST DOCUMENTED IN MEDICAL RECORD: ICD-10-PCS | Mod: CPTII,S$GLB,, | Performed by: ALLERGY & IMMUNOLOGY

## 2022-09-19 PROCEDURE — 99999 PR PBB SHADOW E&M-EST. PATIENT-LVL III: ICD-10-PCS | Mod: PBBFAC,,, | Performed by: ALLERGY & IMMUNOLOGY

## 2022-09-19 PROCEDURE — 3008F PR BODY MASS INDEX (BMI) DOCUMENTED: ICD-10-PCS | Mod: CPTII,S$GLB,, | Performed by: ALLERGY & IMMUNOLOGY

## 2022-09-19 PROCEDURE — 95044 PR ALLERGY PATCH TESTS: ICD-10-PCS | Mod: S$GLB,,, | Performed by: ALLERGY & IMMUNOLOGY

## 2022-09-19 PROCEDURE — 99999 PR PBB SHADOW E&M-EST. PATIENT-LVL III: CPT | Mod: PBBFAC,,, | Performed by: ALLERGY & IMMUNOLOGY

## 2022-09-19 PROCEDURE — 99214 PR OFFICE/OUTPT VISIT, EST, LEVL IV, 30-39 MIN: ICD-10-PCS | Mod: 25,S$GLB,, | Performed by: ALLERGY & IMMUNOLOGY

## 2022-09-19 PROCEDURE — 3075F PR MOST RECENT SYSTOLIC BLOOD PRESS GE 130-139MM HG: ICD-10-PCS | Mod: CPTII,S$GLB,, | Performed by: ALLERGY & IMMUNOLOGY

## 2022-09-19 PROCEDURE — 1159F MED LIST DOCD IN RCRD: CPT | Mod: CPTII,S$GLB,, | Performed by: ALLERGY & IMMUNOLOGY

## 2022-09-19 PROCEDURE — 95044 PATCH/APPLICATION TESTS: CPT | Mod: S$GLB,,, | Performed by: ALLERGY & IMMUNOLOGY

## 2022-09-19 PROCEDURE — 99214 OFFICE O/P EST MOD 30 MIN: CPT | Mod: 25,S$GLB,, | Performed by: ALLERGY & IMMUNOLOGY

## 2022-09-19 NOTE — PROGRESS NOTES
Subjective:       Patient ID: Desean Kent is a 37 y.o. male.        Chief Complaint:  Other (Here for patch test placement)      HPI 8/22/2022: 37 year old male complaining of raised lesions/papular, erythematous, itchy rash on his back and abdomen that started a day after he went to a brewery. Rash started 8 days ago. He drank several types of beers, while at the brewery. He saw his PCP and was given steroids. He denies tongue or throat swelling. He denies a history to any other foods or drink. He reports drinking regularly.  He reports drinking beet regularly without having this issue.        HPI today: 37 year old male with a history of hives after drinking at a brewery.  He is here for patch testing. No symptoms, since th event. NO history of anaphylaxis. NO oral steroids over the last 2-4 weeks. No rash, since the initial event in August.        Past Medical History:  See above      Family History   Problem Relation Age of Onset    Hypertension Mother     Hypertension Father     Asthma Sister     Diabetes Maternal Grandmother        Current Outpatient Medications on File Prior to Visit   Medication Sig Dispense Refill    famotidine (PEPCID) 20 MG tablet Take 1 tablet (20 mg total) by mouth 2 (two) times daily. (Patient not taking: Reported on 9/19/2022) 60 tablet 0    hydrOXYzine HCL (ATARAX) 25 MG tablet Take 1 tablet (25 mg total) by mouth 3 (three) times daily as needed for Itching. (Patient not taking: Reported on 9/19/2022) 60 tablet 0    levocetirizine (XYZAL) 5 MG tablet Take 1 tablet (5 mg total) by mouth every evening. (Patient not taking: Reported on 9/19/2022) 30 tablet 11     No current facility-administered medications on file prior to visit.       Review of patient's allergies indicates:  No Known Allergies    Environmental History: Pets in the home: dogs (1). He smokes- pack a week for 15 years.  Review of Systems   Constitutional:  Negative for chills and fever.   HENT:  Positive for  congestion and rhinorrhea.    Eyes:  Positive for discharge and itching.   Respiratory:  Negative for chest tightness, shortness of breath and wheezing.    Cardiovascular:  Negative for chest pain and leg swelling.   Gastrointestinal:  Negative for nausea and vomiting.   Endocrine: Negative for cold intolerance and heat intolerance.   Skin:  Positive for rash. Negative for wound.   Allergic/Immunologic: Positive for environmental allergies. Negative for food allergies.   Neurological:  Negative for facial asymmetry and speech difficulty.   Hematological:  Negative for adenopathy. Does not bruise/bleed easily.   Psychiatric/Behavioral:  Negative for behavioral problems and suicidal ideas.       Objective:    Physical Exam  Constitutional:       General: He is not in acute distress.     Appearance: Normal appearance. He is not ill-appearing, toxic-appearing or diaphoretic.   HENT:      Head: Normocephalic and atraumatic.      Right Ear: Tympanic membrane, ear canal and external ear normal. There is no impacted cerumen.      Left Ear: Tympanic membrane, ear canal and external ear normal. There is no impacted cerumen.      Nose: Nose normal. No congestion or rhinorrhea.      Mouth/Throat:      Mouth: Mucous membranes are moist.      Pharynx: No oropharyngeal exudate or posterior oropharyngeal erythema.   Eyes:      General:         Right eye: No discharge.         Left eye: No discharge.      Pupils: Pupils are equal, round, and reactive to light.   Neck:      Thyroid: No thyromegaly.   Cardiovascular:      Rate and Rhythm: Normal rate and regular rhythm.      Heart sounds: Normal heart sounds. No murmur heard.    No friction rub. No gallop.   Pulmonary:      Effort: Pulmonary effort is normal. No respiratory distress.      Breath sounds: Normal breath sounds. No stridor. No wheezing, rhonchi or rales.   Abdominal:      General: Bowel sounds are normal. There is no distension.      Palpations: Abdomen is soft. There is  no mass.      Tenderness: There is no abdominal tenderness. There is no guarding or rebound.      Hernia: No hernia is present.   Musculoskeletal:         General: No swelling, tenderness, deformity or signs of injury. Normal range of motion.      Cervical back: Normal range of motion and neck supple. No rigidity or tenderness.      Right lower leg: No edema.      Left lower leg: No edema.   Lymphadenopathy:      Cervical: No cervical adenopathy.   Skin:     General: Skin is warm and dry.      Coloration: Skin is not jaundiced or pale.      Findings: No bruising, erythema or rash.   Neurological:      General: No focal deficit present.      Mental Status: He is alert and oriented to person, place, and time.      Motor: No weakness.      Gait: Gait normal.   Psychiatric:         Mood and Affect: Mood normal.         Behavior: Behavior normal.         Thought Content: Thought content normal.         Judgment: Judgment normal.     Patch test placement   Consent signed  3 panes, 36 allergens placed.    Assessment:       1. Dermatitis           Plan:       Beer is a difficult allergy to test, as it could be related to sulfites or particular additives and preservatives in the beer, of which no commercial test exist.   He currently eats wheat, oat barley without symptoms and has had beer, since the event without symptoms.        History of possible allergic contact dermatitis.  Here today for patch test placement.  Instructions have been reviewed with the patient.      Denies recent oral corticosteroid intake.  No involvement of back.      T.R.U.E. Test patch testing placed today (36 allergens, 3 panels).  Patient given written and verbal instructions detailing to not have areas in direct contact with any water. Patient instructed to avoid hair washing until 72-96 hours.  Pt instructed to avoid indoor/outdoor activities that could lead to increased sweating.  Will remove patches and perform read at 48 hours and then again  at 72-96 hours as MD visit. Patient acknowledged understanding.    DELPHINE KLINE spent a total of 30 minutes on the day of the visit.  This includes face to face time and non-face to face time preparing to see the patient (eg, review of tests), obtaining and/or reviewing separately obtained history, documenting clinical information in the electronic or other health record, independently interpreting results and communicating results to the patient/family/caregiver, or care coordinator.

## 2022-09-21 ENCOUNTER — OFFICE VISIT (OUTPATIENT)
Dept: ALLERGY | Facility: CLINIC | Age: 37
End: 2022-09-21
Payer: COMMERCIAL

## 2022-09-21 VITALS
WEIGHT: 143.06 LBS | DIASTOLIC BLOOD PRESSURE: 83 MMHG | BODY MASS INDEX: 22.45 KG/M2 | SYSTOLIC BLOOD PRESSURE: 139 MMHG | HEART RATE: 58 BPM | HEIGHT: 67 IN | TEMPERATURE: 98 F

## 2022-09-21 DIAGNOSIS — L30.9 DERMATITIS: Primary | ICD-10-CM

## 2022-09-21 PROCEDURE — 3008F PR BODY MASS INDEX (BMI) DOCUMENTED: ICD-10-PCS | Mod: CPTII,S$GLB,, | Performed by: ALLERGY & IMMUNOLOGY

## 2022-09-21 PROCEDURE — 1159F PR MEDICATION LIST DOCUMENTED IN MEDICAL RECORD: ICD-10-PCS | Mod: CPTII,S$GLB,, | Performed by: ALLERGY & IMMUNOLOGY

## 2022-09-21 PROCEDURE — 3079F DIAST BP 80-89 MM HG: CPT | Mod: CPTII,S$GLB,, | Performed by: ALLERGY & IMMUNOLOGY

## 2022-09-21 PROCEDURE — 3008F BODY MASS INDEX DOCD: CPT | Mod: CPTII,S$GLB,, | Performed by: ALLERGY & IMMUNOLOGY

## 2022-09-21 PROCEDURE — 99999 PR PBB SHADOW E&M-EST. PATIENT-LVL III: ICD-10-PCS | Mod: PBBFAC,,, | Performed by: ALLERGY & IMMUNOLOGY

## 2022-09-21 PROCEDURE — 1159F MED LIST DOCD IN RCRD: CPT | Mod: CPTII,S$GLB,, | Performed by: ALLERGY & IMMUNOLOGY

## 2022-09-21 PROCEDURE — 3079F PR MOST RECENT DIASTOLIC BLOOD PRESSURE 80-89 MM HG: ICD-10-PCS | Mod: CPTII,S$GLB,, | Performed by: ALLERGY & IMMUNOLOGY

## 2022-09-21 PROCEDURE — 99214 OFFICE O/P EST MOD 30 MIN: CPT | Mod: S$GLB,,, | Performed by: ALLERGY & IMMUNOLOGY

## 2022-09-21 PROCEDURE — 99999 PR PBB SHADOW E&M-EST. PATIENT-LVL III: CPT | Mod: PBBFAC,,, | Performed by: ALLERGY & IMMUNOLOGY

## 2022-09-21 PROCEDURE — 3075F SYST BP GE 130 - 139MM HG: CPT | Mod: CPTII,S$GLB,, | Performed by: ALLERGY & IMMUNOLOGY

## 2022-09-21 PROCEDURE — 99214 PR OFFICE/OUTPT VISIT, EST, LEVL IV, 30-39 MIN: ICD-10-PCS | Mod: S$GLB,,, | Performed by: ALLERGY & IMMUNOLOGY

## 2022-09-21 PROCEDURE — 3075F PR MOST RECENT SYSTOLIC BLOOD PRESS GE 130-139MM HG: ICD-10-PCS | Mod: CPTII,S$GLB,, | Performed by: ALLERGY & IMMUNOLOGY

## 2022-09-22 ENCOUNTER — OFFICE VISIT (OUTPATIENT)
Dept: ALLERGY | Facility: CLINIC | Age: 37
End: 2022-09-22
Payer: COMMERCIAL

## 2022-09-22 VITALS
HEART RATE: 60 BPM | BODY MASS INDEX: 21.82 KG/M2 | WEIGHT: 139.31 LBS | DIASTOLIC BLOOD PRESSURE: 84 MMHG | TEMPERATURE: 98 F | SYSTOLIC BLOOD PRESSURE: 127 MMHG

## 2022-09-22 DIAGNOSIS — L50.8 ACUTE URTICARIA: Primary | ICD-10-CM

## 2022-09-22 DIAGNOSIS — L30.9 DERMATITIS: ICD-10-CM

## 2022-09-22 PROCEDURE — 3074F PR MOST RECENT SYSTOLIC BLOOD PRESSURE < 130 MM HG: ICD-10-PCS | Mod: CPTII,S$GLB,, | Performed by: ALLERGY & IMMUNOLOGY

## 2022-09-22 PROCEDURE — 99214 OFFICE O/P EST MOD 30 MIN: CPT | Mod: S$GLB,,, | Performed by: ALLERGY & IMMUNOLOGY

## 2022-09-22 PROCEDURE — 3074F SYST BP LT 130 MM HG: CPT | Mod: CPTII,S$GLB,, | Performed by: ALLERGY & IMMUNOLOGY

## 2022-09-22 PROCEDURE — 3079F PR MOST RECENT DIASTOLIC BLOOD PRESSURE 80-89 MM HG: ICD-10-PCS | Mod: CPTII,S$GLB,, | Performed by: ALLERGY & IMMUNOLOGY

## 2022-09-22 PROCEDURE — 3008F BODY MASS INDEX DOCD: CPT | Mod: CPTII,S$GLB,, | Performed by: ALLERGY & IMMUNOLOGY

## 2022-09-22 PROCEDURE — 3008F PR BODY MASS INDEX (BMI) DOCUMENTED: ICD-10-PCS | Mod: CPTII,S$GLB,, | Performed by: ALLERGY & IMMUNOLOGY

## 2022-09-22 PROCEDURE — 99999 PR PBB SHADOW E&M-EST. PATIENT-LVL III: CPT | Mod: PBBFAC,,, | Performed by: ALLERGY & IMMUNOLOGY

## 2022-09-22 PROCEDURE — 99214 PR OFFICE/OUTPT VISIT, EST, LEVL IV, 30-39 MIN: ICD-10-PCS | Mod: S$GLB,,, | Performed by: ALLERGY & IMMUNOLOGY

## 2022-09-22 PROCEDURE — 99999 PR PBB SHADOW E&M-EST. PATIENT-LVL III: ICD-10-PCS | Mod: PBBFAC,,, | Performed by: ALLERGY & IMMUNOLOGY

## 2022-09-22 PROCEDURE — 3079F DIAST BP 80-89 MM HG: CPT | Mod: CPTII,S$GLB,, | Performed by: ALLERGY & IMMUNOLOGY

## 2022-09-22 NOTE — PROGRESS NOTES
Subjective:       Patient ID: Desean Kent is a 37 y.o. male.        Chief Complaint:  Follow-up      HPI 8/22/2022: 37 year old male complaining of raised lesions/papular, erythematous, itchy rash on his back and abdomen that started a day after he went to a brewery. Rash started 8 days ago. He drank several types of beers, while at the brewery. He saw his PCP and was given steroids. He denies tongue or throat swelling. He denies a history to any other foods or drink. He reports drinking regularly.  He reports drinking beet regularly without having this issue.        HPI 9/19/2022: 37 year old male with a history of hives after drinking at a brewery.  He is here for patch testing. No symptoms, since th event. NO history of anaphylaxis. NO oral steroids over the last 2-4 weeks. No rash, since the initial event in August.    HPI 9/21/2022: 37 year old female with a history of a rash. He is here for patch test removal. He denies symptoms. He denies itching.    HPI today: 37 year old male with a history of hives after drinking beers at a brewery. Here for 72 hour patch test reading. He reports mild itching.      Past Medical History:  See above      Family History   Problem Relation Age of Onset    Hypertension Mother     Hypertension Father     Asthma Sister     Diabetes Maternal Grandmother        Current Outpatient Medications on File Prior to Visit   Medication Sig Dispense Refill    famotidine (PEPCID) 20 MG tablet Take 1 tablet (20 mg total) by mouth 2 (two) times daily. 60 tablet 0    hydrOXYzine HCL (ATARAX) 25 MG tablet Take 1 tablet (25 mg total) by mouth 3 (three) times daily as needed for Itching. 60 tablet 0    levocetirizine (XYZAL) 5 MG tablet Take 1 tablet (5 mg total) by mouth every evening. 30 tablet 11     No current facility-administered medications on file prior to visit.       Review of patient's allergies indicates:  No Known Allergies    Environmental History: Pets in the home: dogs  (1). He smokes- pack a week for 15 years.  Review of Systems   Constitutional:  Negative for chills and fever.   HENT:  Positive for congestion and rhinorrhea.    Eyes:  Positive for discharge and itching.   Respiratory:  Negative for chest tightness, shortness of breath and wheezing.    Cardiovascular:  Negative for chest pain and leg swelling.   Gastrointestinal:  Negative for nausea and vomiting.   Endocrine: Negative for cold intolerance and heat intolerance.   Skin:  Positive for rash. Negative for wound.   Allergic/Immunologic: Positive for environmental allergies. Negative for food allergies.   Neurological:  Negative for facial asymmetry and speech difficulty.   Hematological:  Negative for adenopathy. Does not bruise/bleed easily.   Psychiatric/Behavioral:  Negative for behavioral problems and suicidal ideas.       Objective:    Physical Exam  Constitutional:       General: He is not in acute distress.     Appearance: Normal appearance. He is not ill-appearing, toxic-appearing or diaphoretic.   HENT:      Head: Normocephalic and atraumatic.      Right Ear: External ear normal.      Left Ear: External ear normal.   Eyes:      General:         Right eye: No discharge.         Left eye: No discharge.      Pupils: Pupils are equal, round, and reactive to light.   Neck:      Thyroid: No thyromegaly.   Cardiovascular:      Rate and Rhythm: Normal rate and regular rhythm.      Heart sounds: Normal heart sounds. No murmur heard.    No friction rub. No gallop.   Pulmonary:      Effort: Pulmonary effort is normal. No respiratory distress.      Breath sounds: Normal breath sounds. No stridor. No wheezing, rhonchi or rales.   Skin:     General: Skin is warm and dry.      Coloration: Skin is not jaundiced or pale.      Findings: No bruising, erythema or rash.   Neurological:      General: No focal deficit present.      Mental Status: He is alert and oriented to person, place, and time.      Motor: No weakness.       Gait: Gait normal.   Psychiatric:         Mood and Affect: Mood normal.         Behavior: Behavior normal.         Thought Content: Thought content normal.         Judgment: Judgment normal.     T.R.U.E patch testing reading- 72 hours-     Negative          Assessment:       1. Acute urticaria    2. Dermatitis               Plan:       Beer is a difficult allergy to test, as it could be related to sulfites or particular additives and preservatives in the beer, of which no commercial test exist.   He currently eats wheat, oat barley without symptoms and has had beer, since the event without symptoms.    Patch test reading was negative.    Advised that at time we do not identify an etiology of hives/dermatitis.  RTC prn        MD,DELPHINE DUDLEY spent a total of 30 minutes on the day of the visit.  This includes face to face time and non-face to face time preparing to see the patient (eg, review of tests), obtaining and/or reviewing separately obtained history, documenting clinical information in the electronic or other health record, independently interpreting results and communicating results to the patient/family/caregiver, or care coordinator.

## 2022-09-22 NOTE — PROGRESS NOTES
Subjective:       Patient ID: Desean Kent is a 37 y.o. male.        Chief Complaint:  Follow-up      HPI 8/22/2022: 37 year old male complaining of raised lesions/papular, erythematous, itchy rash on his back and abdomen that started a day after he went to a brewery. Rash started 8 days ago. He drank several types of beers, while at the brewery. He saw his PCP and was given steroids. He denies tongue or throat swelling. He denies a history to any other foods or drink. He reports drinking regularly.  He reports drinking beet regularly without having this issue.        HPI 9/19/2022: 37 year old male with a history of hives after drinking at a brewery.  He is here for patch testing. No symptoms, since th event. NO history of anaphylaxis. NO oral steroids over the last 2-4 weeks. No rash, since the initial event in August.    HPI today: 37 year old female with a history of a rash. He is here for patch test removal. He denies symptoms. He denies itching.        Past Medical History:  See above      Family History   Problem Relation Age of Onset    Hypertension Mother     Hypertension Father     Asthma Sister     Diabetes Maternal Grandmother        Current Outpatient Medications on File Prior to Visit   Medication Sig Dispense Refill    famotidine (PEPCID) 20 MG tablet Take 1 tablet (20 mg total) by mouth 2 (two) times daily. (Patient not taking: No sig reported) 60 tablet 0    hydrOXYzine HCL (ATARAX) 25 MG tablet Take 1 tablet (25 mg total) by mouth 3 (three) times daily as needed for Itching. (Patient not taking: No sig reported) 60 tablet 0    levocetirizine (XYZAL) 5 MG tablet Take 1 tablet (5 mg total) by mouth every evening. (Patient not taking: No sig reported) 30 tablet 11     No current facility-administered medications on file prior to visit.       Review of patient's allergies indicates:  No Known Allergies    Environmental History: Pets in the home: dogs (1). He smokes- pack a week for 15 years.  Review  of Systems   Constitutional:  Negative for chills and fever.   HENT:  Positive for congestion and rhinorrhea.    Eyes:  Positive for discharge and itching.   Respiratory:  Negative for chest tightness, shortness of breath and wheezing.    Cardiovascular:  Negative for chest pain and leg swelling.   Gastrointestinal:  Negative for nausea and vomiting.   Endocrine: Negative for cold intolerance and heat intolerance.   Skin:  Positive for rash. Negative for wound.   Allergic/Immunologic: Positive for environmental allergies. Negative for food allergies.   Neurological:  Negative for facial asymmetry and speech difficulty.   Hematological:  Negative for adenopathy. Does not bruise/bleed easily.   Psychiatric/Behavioral:  Negative for behavioral problems and suicidal ideas.       Objective:    Physical Exam  Constitutional:       General: He is not in acute distress.     Appearance: Normal appearance. He is not ill-appearing, toxic-appearing or diaphoretic.   HENT:      Head: Normocephalic and atraumatic.      Right Ear: Tympanic membrane, ear canal and external ear normal. There is no impacted cerumen.      Left Ear: Tympanic membrane, ear canal and external ear normal. There is no impacted cerumen.      Nose: Nose normal. No congestion or rhinorrhea.      Mouth/Throat:      Mouth: Mucous membranes are moist.      Pharynx: No oropharyngeal exudate or posterior oropharyngeal erythema.   Eyes:      General:         Right eye: No discharge.         Left eye: No discharge.      Pupils: Pupils are equal, round, and reactive to light.   Neck:      Thyroid: No thyromegaly.   Cardiovascular:      Rate and Rhythm: Normal rate and regular rhythm.      Heart sounds: Normal heart sounds. No murmur heard.    No friction rub. No gallop.   Pulmonary:      Effort: Pulmonary effort is normal. No respiratory distress.      Breath sounds: Normal breath sounds. No stridor. No wheezing, rhonchi or rales.   Abdominal:      General: Bowel  sounds are normal. There is no distension.      Palpations: Abdomen is soft. There is no mass.      Tenderness: There is no abdominal tenderness. There is no guarding or rebound.      Hernia: No hernia is present.   Musculoskeletal:         General: No swelling, tenderness, deformity or signs of injury. Normal range of motion.      Cervical back: Normal range of motion and neck supple. No rigidity or tenderness.      Right lower leg: No edema.      Left lower leg: No edema.   Lymphadenopathy:      Cervical: No cervical adenopathy.   Skin:     General: Skin is warm and dry.      Coloration: Skin is not jaundiced or pale.      Findings: No bruising, erythema or rash.   Neurological:      General: No focal deficit present.      Mental Status: He is alert and oriented to person, place, and time.      Motor: No weakness.      Gait: Gait normal.   Psychiatric:         Mood and Affect: Mood normal.         Behavior: Behavior normal.         Thought Content: Thought content normal.         Judgment: Judgment normal.       History of possible allergic contact dermatitis.  Here today for 48 hour patch test read.  Instructions have been reviewed with the patient.      Denies recent oral corticosteroid intake.  No involvement of back.      T.R.U.E. Test patch testing removed today (36 allergens, 3 panels).  Patient was allowed to sit in examination for 30 minutes prior to reading.  Pt noted to be positive to the following allergens:    None          Assessment:       1. Dermatitis             Plan:       Beer is a difficult allergy to test, as it could be related to sulfites or particular additives and preservatives in the beer, of which no commercial test exist.   He currently eats wheat, oat barley without symptoms and has had beer, since the event without symptoms.      Patch test removal today. He waited 30 minutes prior to reading.  Nothing was positive.  He will return tomorrow for his 72 hour  jocelin KLINE,FACAMELIA    I spent a total of 30 minutes on the day of the visit.  This includes face to face time and non-face to face time preparing to see the patient (eg, review of tests), obtaining and/or reviewing separately obtained history, documenting clinical information in the electronic or other health record, independently interpreting results and communicating results to the patient/family/caregiver, or care coordinator.

## 2023-09-25 ENCOUNTER — PATIENT MESSAGE (OUTPATIENT)
Dept: ADMINISTRATIVE | Facility: HOSPITAL | Age: 38
End: 2023-09-25
Payer: COMMERCIAL

## 2024-04-16 ENCOUNTER — LAB VISIT (OUTPATIENT)
Dept: LAB | Facility: HOSPITAL | Age: 39
End: 2024-04-16
Payer: COMMERCIAL

## 2024-04-16 ENCOUNTER — HOSPITAL ENCOUNTER (OUTPATIENT)
Dept: RADIOLOGY | Facility: HOSPITAL | Age: 39
Discharge: HOME | End: 2024-04-16
Attending: NURSE PRACTITIONER
Payer: COMMERCIAL

## 2024-04-16 ENCOUNTER — OFFICE VISIT (OUTPATIENT)
Dept: PRIMARY CARE CLINIC | Facility: CLINIC | Age: 39
End: 2024-04-16
Payer: COMMERCIAL

## 2024-04-16 VITALS
TEMPERATURE: 97 F | HEART RATE: 63 BPM | HEIGHT: 67 IN | BODY MASS INDEX: 24.56 KG/M2 | SYSTOLIC BLOOD PRESSURE: 130 MMHG | DIASTOLIC BLOOD PRESSURE: 78 MMHG | WEIGHT: 156.5 LBS | OXYGEN SATURATION: 98 %

## 2024-04-16 DIAGNOSIS — M54.50 MIDLINE LOW BACK PAIN WITHOUT SCIATICA, UNSPECIFIED CHRONICITY: ICD-10-CM

## 2024-04-16 DIAGNOSIS — Z00.00 ROUTINE PHYSICAL EXAMINATION: Primary | ICD-10-CM

## 2024-04-16 DIAGNOSIS — Z00.00 ROUTINE PHYSICAL EXAMINATION: ICD-10-CM

## 2024-04-16 DIAGNOSIS — Z11.3 SCREENING EXAMINATION FOR STD (SEXUALLY TRANSMITTED DISEASE): ICD-10-CM

## 2024-04-16 LAB
BILIRUB UR QL STRIP: NEGATIVE
CLARITY UR: CLEAR
COLOR UR: YELLOW
GLUCOSE UR QL STRIP: NEGATIVE
HGB UR QL STRIP: ABNORMAL
KETONES UR QL STRIP: NEGATIVE
LEUKOCYTE ESTERASE UR QL STRIP: NEGATIVE
MICROSCOPIC COMMENT: ABNORMAL
NITRITE UR QL STRIP: NEGATIVE
PH UR STRIP: 6 [PH] (ref 5–8)
PROT UR QL STRIP: NEGATIVE
RBC #/AREA URNS HPF: 5 /HPF (ref 0–4)
SP GR UR STRIP: 1.02 (ref 1–1.03)
URN SPEC COLLECT METH UR: ABNORMAL

## 2024-04-16 PROCEDURE — 1159F MED LIST DOCD IN RCRD: CPT | Mod: CPTII,S$GLB,, | Performed by: NURSE PRACTITIONER

## 2024-04-16 PROCEDURE — 1160F RVW MEDS BY RX/DR IN RCRD: CPT | Mod: CPTII,S$GLB,, | Performed by: NURSE PRACTITIONER

## 2024-04-16 PROCEDURE — 72100 X-RAY EXAM L-S SPINE 2/3 VWS: CPT | Mod: TC

## 2024-04-16 PROCEDURE — 3075F SYST BP GE 130 - 139MM HG: CPT | Mod: CPTII,S$GLB,, | Performed by: NURSE PRACTITIONER

## 2024-04-16 PROCEDURE — 99999 PR PBB SHADOW E&M-EST. PATIENT-LVL III: CPT | Mod: PBBFAC,,, | Performed by: NURSE PRACTITIONER

## 2024-04-16 PROCEDURE — 3044F HG A1C LEVEL LT 7.0%: CPT | Mod: CPTII,S$GLB,, | Performed by: NURSE PRACTITIONER

## 2024-04-16 PROCEDURE — 3008F BODY MASS INDEX DOCD: CPT | Mod: CPTII,S$GLB,, | Performed by: NURSE PRACTITIONER

## 2024-04-16 PROCEDURE — 81000 URINALYSIS NONAUTO W/SCOPE: CPT | Performed by: NURSE PRACTITIONER

## 2024-04-16 PROCEDURE — 99214 OFFICE O/P EST MOD 30 MIN: CPT | Mod: S$GLB,,, | Performed by: NURSE PRACTITIONER

## 2024-04-16 PROCEDURE — 72100 X-RAY EXAM L-S SPINE 2/3 VWS: CPT | Mod: 26,,, | Performed by: RADIOLOGY

## 2024-04-16 PROCEDURE — 3078F DIAST BP <80 MM HG: CPT | Mod: CPTII,S$GLB,, | Performed by: NURSE PRACTITIONER

## 2024-04-16 NOTE — PROGRESS NOTES
HPI     Chief Complaint  Chief Complaint   Patient presents with    Annual Exam       HPI  Desean Kent is a 39 y.o. male with multiple medical diagnoses as listed in the medical history and problem list that presents for Routine Physical Exam.  This patient is new to me.     Routine Physical Exam: No acute concerns today however, he complains on ongoing low back pain for approx five years. Reports stiffness as the main complaint that usually occurs after playing tennis. Reports playing soccer for years. No recent trauma or falls. No history of MVA or past MSK traumas. Stable vitals. Seeking to Est Care at [facility].     History     PAST MEDICAL HISTORY:  No past medical history on file.    PAST SURGICAL HISTORY:  No past surgical history on file.    SOCIAL HISTORY:  Social History     Socioeconomic History    Marital status: Single   Tobacco Use    Smoking status: Every Day     Types: Cigarettes    Smokeless tobacco: Never   Substance and Sexual Activity    Alcohol use: Yes    Drug use: No    Sexual activity: Yes     Partners: Female       FAMILY HISTORY:  Family History   Problem Relation Name Age of Onset    Hypertension Mother      Hypertension Father      Asthma Sister      Diabetes Maternal Grandmother         ALLERGIES AND MEDICATIONS: updated and reviewed.  Review of patient's allergies indicates:  No Known Allergies  No current outpatient medications on file.     No current facility-administered medications for this visit.       Exam     ROS  Review of Systems   Constitutional:  Negative for appetite change, chills, fatigue and fever.   HENT:  Negative for congestion, ear pain, postnasal drip, rhinorrhea, sneezing, sore throat and tinnitus.    Respiratory:  Negative for cough and shortness of breath.    Cardiovascular:  Negative for chest pain and palpitations.   Gastrointestinal:  Negative for abdominal pain, constipation, diarrhea, nausea and vomiting.   Genitourinary:  Negative for difficulty urinating and  "dysuria.   Musculoskeletal:  Negative for arthralgias.   Neurological:  Negative for headaches.           Physical Exam  Vitals:    04/16/24 1025   BP: 130/78   Pulse: 63   Temp: 96.7 °F (35.9 °C)   SpO2: 98%   Weight: 71 kg (156 lb 8.4 oz)   Height: 5' 7" (1.702 m)    Body mass index is 24.52 kg/m².  Weight: 71 kg (156 lb 8.4 oz)   Height: 5' 7" (170.2 cm)   Physical Exam  Constitutional:       General: He is not in acute distress.     Appearance: Normal appearance. He is well-developed.   HENT:      Head: Normocephalic and atraumatic.      Right Ear: External ear normal.      Left Ear: External ear normal.      Nose: Nose normal.      Mouth/Throat:      Pharynx: No oropharyngeal exudate.   Eyes:      Pupils: Pupils are equal, round, and reactive to light.   Cardiovascular:      Rate and Rhythm: Normal rate and regular rhythm.      Pulses: Normal pulses.      Heart sounds: No murmur heard.     No friction rub. No gallop.   Pulmonary:      Effort: Pulmonary effort is normal. No respiratory distress.      Breath sounds: Normal breath sounds. No wheezing.   Abdominal:      General: Bowel sounds are normal.      Palpations: Abdomen is soft.   Musculoskeletal:      Cervical back: Neck supple.   Lymphadenopathy:      Cervical: No cervical adenopathy.   Skin:     General: Skin is warm and dry.   Neurological:      Mental Status: He is alert and oriented to person, place, and time.   Psychiatric:         Mood and Affect: Mood normal.           Health Maintenance         Date Due Completion Date    Pneumococcal Vaccines (Age 0-64) (1 of 2 - PCV) Never done ---    TETANUS VACCINE Never done ---    Influenza Vaccine (1) Never done ---    COVID-19 Vaccine (4 - 2023-24 season) 09/01/2023 12/28/2021    Lipid Panel 04/16/2029 4/16/2024            Assessment & Plan     Assessment & Plan  Problem List Items Addressed This Visit    None  Visit Diagnoses       Routine physical examination    -  Primary    Relevant Orders    CBC Auto " Differential (Completed)    Comprehensive Metabolic Panel (Completed)    Lipid Panel (Completed)    Hemoglobin A1C (Completed)    TSH (Completed)    Urinalysis (Completed)    Screening examination for STD (sexually transmitted disease)        Relevant Orders    HIV 1/2 Ag/Ab (4th Gen) (Completed)    Hepatitis C Antibody (Completed)    Midline low back pain without sciatica, unspecified chronicity        Relevant Orders    X-Ray Lumbar Spine Ap And Lateral (Completed)              Health Maintenance reviewed: Deferred at this time    Follow-up: 1. Non-fasting labs today at [facility]  2. Lumbar spine xray today at the [facility]  3.  Schedule Est Care visit with Dr. Marsh in three months.    30+ minutes of total time spent on the encounter, which includes face to face time and non-face to face time preparing to see the patient (eg, review of tests), Obtaining and/or reviewing separately obtained history, documenting clinical information in the electronic or other health record, independently interpreting results (not separately reported) and communicating results to the patient/family/caregiver, or Care coordination (not separately reported).      Sincerely,  Osmin Davis NP

## 2024-04-17 ENCOUNTER — TELEPHONE (OUTPATIENT)
Dept: PRIMARY CARE CLINIC | Facility: CLINIC | Age: 39
End: 2024-04-17
Payer: COMMERCIAL

## 2024-04-17 NOTE — TELEPHONE ENCOUNTER
Called pt no answer I left a message on his voicemail   For him to give us a call back or message us through the [employer] portal with a good date and time that's works best for him

## 2024-10-07 ENCOUNTER — OFFICE VISIT (OUTPATIENT)
Dept: PRIMARY CARE CLINIC | Facility: CLINIC | Age: 39
End: 2024-10-07
Payer: COMMERCIAL

## 2024-10-07 DIAGNOSIS — R74.01 ELEVATED ALT MEASUREMENT: Primary | ICD-10-CM

## 2024-10-07 DIAGNOSIS — G89.29 CHRONIC LOW BACK PAIN, UNSPECIFIED BACK PAIN LATERALITY, UNSPECIFIED WHETHER SCIATICA PRESENT: ICD-10-CM

## 2024-10-07 DIAGNOSIS — Z79.899 MEDICAL MARIJUANA USE: ICD-10-CM

## 2024-10-07 DIAGNOSIS — M54.50 CHRONIC LOW BACK PAIN, UNSPECIFIED BACK PAIN LATERALITY, UNSPECIFIED WHETHER SCIATICA PRESENT: ICD-10-CM

## 2024-10-07 PROCEDURE — 1159F MED LIST DOCD IN RCRD: CPT | Mod: CPTII,95,, | Performed by: FAMILY MEDICINE

## 2024-10-07 PROCEDURE — 3044F HG A1C LEVEL LT 7.0%: CPT | Mod: CPTII,95,, | Performed by: FAMILY MEDICINE

## 2024-10-07 PROCEDURE — G2211 COMPLEX E/M VISIT ADD ON: HCPCS | Mod: 95,,, | Performed by: FAMILY MEDICINE

## 2024-10-07 PROCEDURE — 1160F RVW MEDS BY RX/DR IN RCRD: CPT | Mod: CPTII,95,, | Performed by: FAMILY MEDICINE

## 2024-10-07 PROCEDURE — 99214 OFFICE O/P EST MOD 30 MIN: CPT | Mod: 95,,, | Performed by: FAMILY MEDICINE

## 2024-10-07 NOTE — PROGRESS NOTES
Chief Complaint  Chief Complaint   Patient presents with    Follow-up       HPI  Desean Kent is a 39 y.o. male with multiple medical diagnoses as listed in the medical history and problem list that presents for  virtual visit.       History of Present Illness    CHIEF COMPLAINT:  - The patient presents for an initial visit to establish care with a new PCP following a routine physical exam with a nurse practitioner in April.    HPI:  Patient reports arthritis issues in his knees and elbows. An MRI of his knees revealed absence of cartilage, which he attributes to playing soccer since age 10. 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 The patient uses medical marijuana for chronic back pain and joint pain, obtained through [employer]. He smokes the flower form every 2-3 days, finding it effective for managing his back and knee pain. He renews his prescription annually and obtains approximately 3.5 g every 2-3 months.    April lab work revealed elevated total cholesterol at 232, with triglycerides slightly high at 195 and LDL at 144. Liver function tests showed elevated ALT at 54 and AST at 34. Since then, the patient reports dietary changes, including increased consumption of vegetables and fruits. He has been playing tennis with his girlfriend for about 2 years at [employer] around [place].    The patient is a former smoker, having quit 2 years ago. Prior to quitting, he smoked about 2 packs per week. He reports occasional alcohol consumption, reduced from previous levels. The patient denies any new health concerns since his April visit and denies current daily smoking.    MEDICATIONS:  - Medical marijuana, 1 dose every 2-3 days, smoked (flower), for back pain and joint pain    PMH:  - Arthritis: Knees and elbows.  Elevated cholesterol.  Elevated liver enzymes.    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         Pmh, Psh, Family Hx, Social Hx updated in Epic Tabs today.     Review of Systems   Constitutional:  Negative for activity change and unexpected weight change.   HENT:  Negative for hearing loss, rhinorrhea and trouble swallowing.    Eyes:  Negative for discharge and visual disturbance.   Respiratory:  Negative for chest tightness and wheezing.    Cardiovascular:  Negative for chest pain and palpitations.   Gastrointestinal:  Negative for blood in stool, constipation, diarrhea and vomiting.   Endocrine: Negative for polydipsia and polyuria.   Genitourinary:  Negative for difficulty urinating, hematuria and urgency.   Musculoskeletal:  Positive for arthralgias. Negative for joint swelling and neck pain.   Neurological:  Negative for weakness and headaches.   Psychiatric/Behavioral:  Negative for confusion and dysphoric mood.            Physical Exam  Vitals reviewed.   Constitutional:       General: He is awake. He is not in acute distress.     Appearance: Normal appearance. He is well-developed, well-groomed and normal weight. He is not ill-appearing.   HENT:      Head: Normocephalic and atraumatic.      Right Ear: External ear normal.      Left Ear: External ear normal.      Nose: Nose normal.      Mouth/Throat:      Lips: Pink.   Eyes:      Conjunctiva/sclera: Conjunctivae normal.   Pulmonary:      Effort: Pulmonary effort is normal.   Neurological:      Mental Status: He is alert.   Psychiatric:         Attention and Perception: Attention and perception normal. He is attentive.         Mood and Affect: Mood and affect  normal. Mood is not anxious or depressed. Affect is not labile, blunt, angry or inappropriate.         Speech: Speech normal. He is communicative. Speech is not rapid and pressured, delayed, slurred or tangential.         Behavior: Behavior normal. Behavior is not agitated, slowed, aggressive, withdrawn, hyperactive or combative. Behavior is cooperative.         Thought Content: Thought content normal. Thought content is not paranoid or delusional. Thought content does not include homicidal or suicidal ideation. Thought content does not include homicidal or suicidal plan.         Cognition and Memory: Cognition and memory normal. Memory is not impaired. He does not exhibit impaired recent memory or impaired remote memory.         Judgment: Judgment normal. Judgment is not impulsive or inappropriate.       Physical Exam    TEST RESULTS:  - Cholesterol profile: April 2023, total cholesterol 232 (elevated), triglycerides 195 (slightly high),  (elevated)  Liver function test: April 2023, ALT 54 (elevated), AST 34  Thyroid levels: April 2023, normal.  Blood sugar: April 2023, normal.  Complete blood count: April 2023, normal.  - Vascular ultrasound: Early 2023  IMAGING:  - MRI Knees: Results showed absence of cartilage in knees           ASSESSMENT/PLAN:  1. Elevated ALT measurement  -     HEPATIC FUNCTION PANEL; Future; Expected date: 10/11/2024  -     Gamma GT; Future; Expected date: 10/11/2024    2. Chronic low back pain, unspecified back pain laterality, unspecified whether sciatica present    3. Medical marijuana use      Assessment & Plan    ELEVATED LIVER ENZYMES:  - Noted elevated liver function test with ALT at 54 and AST at 34 in April, with ALT being minimally elevated.  - Explained that elevated liver enzymes can be due to alcohol consumption.  - Recommend reducing alcohol intake to help normalize liver enzyme levels.  - Scheduled a repeat liver function test for Friday, October 11th at 7:30 AM.  -  Plan to order a liver ultrasound if liver enzymes remain elevated.  - Discussed potential impact of alcohol consumption on liver enzyme elevation.  - Advised patient to reduce alcohol consumption to help normalize liver enzymes.    ELBOW ARTHRALGIA:  - Considered elbow arthralgia, potentially related to previous soccer injuries and current work activities.  - Patient reports aches and pains in elbows from work.  - Noted patient's use of medical marijuana for joint pain, including elbow pain.    KNEE ARTHRALGIA:  - Considered knee arthralgia, potentially related to previous soccer injuries and current work activities.  - Reviewed use of medical marijuana for back and knee pain, noting apparent effectiveness.  - Patient reports knee pain from playing soccer.  - MRI shows patient has no cartilage remaining in knees.    MEDICAL MARIJUANA USE:  - Advised on importance of consistent follow-up with medical marijuana provider and proper dosing.  - Instructed patient to ensure employer is aware of medical marijuana use.  - Patient uses medical marijuana regularly for pain management.    COVID-19 VACCINATION:  - Confirmed patient has received original two COVID-19 vaccinations and one booster.  - Recommend getting COVID-19 booster.    INFLUENZA VACCINATION:  - Recommend getting influenza vaccination.    TETANUS VACCINATION:  - Advised patient to receive a tetanus vaccination.    HYPERLIPIDEMIA:  - Assessed cholesterol profile from April: elevated total cholesterol (232), triglycerides (195), and LDL (144).  - Noted LDL cholesterol is elevated, ideally should be closer to 130 or less.  - Recommend dietary changes to address cholesterol levels.  - Explained benefits of Mediterranean diet for improving cholesterol levels.  - Advised patient to continue Mediterranean-style diet to address elevated triglycerides and cholesterol.  - Recommend adopting Mediterranean diet principles, focusing on chicken, fish, and reducing processed  foods and red meat.    DIET AND NUTRITION:  - Patient reports increased consumption of vegetables and fruits since last visit.  - Patient to maintain increased intake of fruits and vegetables.    ALCOHOL USE:  - Patient reports occasional alcohol use, less than before.    SMOKING CESSATION:  - Patient reports quitting smoking 2 years ago.  - Noted patient's previous smoking habit of approximately 2 packs per week.    PHYSICAL ACTIVITY:  - Patient to continue playing tennis for physical activity.       X-Ray Lumbar Spine Ap And Lateral  Narrative: EXAM: XR LUMBAR SPINE AP AND LATERAL    CLINICAL HISTORY: Back pain.    FINDINGS:     3 views.    There is mild levocurvature at the thoracolumbar junction.     No fractures or dislocations.     Vertebral body heights appear normal.     Disc space heights are normal.  Impression: Mild levocurvature at the thoracolumbar junction.  Otherwise unremarkable study.    Finalized on: 4/16/2024 12:06 PM By:  Jett Gomez MD  [facility]# [id number]      2024-04-16 12:08:16.545    [facility]    Lab Results   Component Value Date    WBC 6.24 04/16/2024    HGB 15.5 04/16/2024    HCT 47.6 04/16/2024     04/16/2024    CHOL 232 (H) 04/16/2024    TRIG 195 (H) 04/16/2024    HDL 49 04/16/2024    ALT 54 (H) 04/16/2024    AST 34 04/16/2024     04/16/2024    K 4.0 04/16/2024     04/16/2024    CREATININE 1.1 04/16/2024    BUN 9 04/16/2024    CO2 25 04/16/2024    TSH 1.502 04/16/2024    HGBA1C 5.4 04/16/2024       Follow-up: Follow up in about 6 months (around 4/7/2025) for physical with Dr SY or Osmin Davis .    ______________________________________________________________________    Face to Face time with patient:  3:40 PM CDT - 358pm     The patient location is:  work   The chief complaint leading to consultation is: noted   Visit type: Virtual visit with synchronous audio and video   Each patient to whom he or she provides medical services by telemedicine is:  (1) informed of the  relationship between the physician and patient and the respective role of any other health care provider with respect to management of the patient; and (2) notified that he or she may decline to receive medical services by telemedicine and may withdraw from such care at any time.    I spent a total of  25     minutes face to face and non-face to face on the date of this visit.This includes time preparing to see the patient (eg, review of tests, notes), obtaining and/or reviewing additional history from an independent historian and/or outside medical records, documenting clinical information in the electronic health record, independently interpreting results and/or communicating results to the patient/family/caregiver, or care coordinator.  Visit today included increased complexity associated with the care of the episodic problem addressed and managing the longitudinal care of the patient due to the serious and/or complex managed problem(s).    This note was generated with the assistance of ambient listening technology. Verbal consent was obtained by the patient and accompanying visitor(s) for the recording of patient appointment to facilitate this note. I attest to having reviewed and edited the generated note for accuracy, though some syntax or spelling errors may persist. Please contact the author of this note for any clarification.

## 2024-10-11 ENCOUNTER — LAB VISIT (OUTPATIENT)
Dept: LAB | Facility: HOSPITAL | Age: 39
End: 2024-10-11
Attending: FAMILY MEDICINE
Payer: COMMERCIAL

## 2024-10-11 DIAGNOSIS — R74.01 ELEVATED ALT MEASUREMENT: ICD-10-CM

## 2024-10-11 LAB
ALBUMIN SERPL BCP-MCNC: 4.1 G/DL (ref 3.5–5.2)
ALP SERPL-CCNC: 66 U/L (ref 55–135)
ALT SERPL W/O P-5'-P-CCNC: 35 U/L (ref 10–44)
AST SERPL-CCNC: 24 U/L (ref 10–40)
BILIRUB DIRECT SERPL-MCNC: 0.3 MG/DL (ref 0.1–0.3)
BILIRUB SERPL-MCNC: 0.8 MG/DL (ref 0.1–1)
GGT SERPL-CCNC: 20 U/L (ref 8–55)
PROT SERPL-MCNC: 7.6 G/DL (ref 6–8.4)

## 2024-10-11 PROCEDURE — 82977 ASSAY OF GGT: CPT | Performed by: FAMILY MEDICINE

## 2024-10-11 PROCEDURE — 36415 COLL VENOUS BLD VENIPUNCTURE: CPT | Mod: PN | Performed by: FAMILY MEDICINE

## 2024-10-11 PROCEDURE — 80076 HEPATIC FUNCTION PANEL: CPT | Performed by: FAMILY MEDICINE

## 2024-10-24 ENCOUNTER — PATIENT MESSAGE (OUTPATIENT)
Dept: RESEARCH | Facility: HOSPITAL | Age: 39
End: 2024-10-24
Payer: COMMERCIAL

## 2025-04-07 ENCOUNTER — LAB VISIT (OUTPATIENT)
Dept: LAB | Facility: HOSPITAL | Age: 40
End: 2025-04-07
Attending: FAMILY MEDICINE
Payer: COMMERCIAL

## 2025-04-07 ENCOUNTER — OFFICE VISIT (OUTPATIENT)
Dept: PRIMARY CARE CLINIC | Facility: CLINIC | Age: 40
End: 2025-04-07
Payer: COMMERCIAL

## 2025-04-07 VITALS
TEMPERATURE: 98 F | WEIGHT: 149.69 LBS | OXYGEN SATURATION: 98 % | RESPIRATION RATE: 19 BRPM | DIASTOLIC BLOOD PRESSURE: 76 MMHG | BODY MASS INDEX: 23.49 KG/M2 | HEIGHT: 67 IN | SYSTOLIC BLOOD PRESSURE: 110 MMHG | HEART RATE: 62 BPM

## 2025-04-07 DIAGNOSIS — Z00.00 ROUTINE GENERAL MEDICAL EXAMINATION AT A HEALTH CARE FACILITY: ICD-10-CM

## 2025-04-07 DIAGNOSIS — Z00.00 ROUTINE GENERAL MEDICAL EXAMINATION AT A HEALTH CARE FACILITY: Primary | ICD-10-CM

## 2025-04-07 DIAGNOSIS — R74.01 ELEVATED ALT MEASUREMENT: ICD-10-CM

## 2025-04-07 LAB
ALBUMIN SERPL BCP-MCNC: 4.3 G/DL (ref 3.5–5.2)
ALP SERPL-CCNC: 59 UNIT/L (ref 40–150)
ALT SERPL W/O P-5'-P-CCNC: 31 UNIT/L (ref 10–44)
ANION GAP (OHS): 11 MMOL/L (ref 8–16)
AST SERPL-CCNC: 24 UNIT/L (ref 11–45)
BILIRUB SERPL-MCNC: 0.9 MG/DL (ref 0.1–1)
BUN SERPL-MCNC: 15 MG/DL (ref 6–20)
CALCIUM SERPL-MCNC: 10 MG/DL (ref 8.7–10.5)
CHLORIDE SERPL-SCNC: 103 MMOL/L (ref 95–110)
CHOLEST SERPL-MCNC: 257 MG/DL (ref 120–199)
CHOLEST/HDLC SERPL: 4.8 {RATIO} (ref 2–5)
CO2 SERPL-SCNC: 23 MMOL/L (ref 23–29)
CREAT SERPL-MCNC: 1.2 MG/DL (ref 0.5–1.4)
EAG (OHS): 111 MG/DL (ref 68–131)
ERYTHROCYTE [DISTWIDTH] IN BLOOD BY AUTOMATED COUNT: 13.2 % (ref 11.5–14.5)
GFR SERPLBLD CREATININE-BSD FMLA CKD-EPI: >60 ML/MIN/1.73/M2
GGT SERPL-CCNC: 22 U/L (ref 8–55)
GLUCOSE SERPL-MCNC: 89 MG/DL (ref 70–110)
HBA1C MFR BLD: 5.5 % (ref 4–5.6)
HCT VFR BLD AUTO: 46.9 % (ref 40–54)
HDLC SERPL-MCNC: 54 MG/DL (ref 40–75)
HDLC SERPL: 21 % (ref 20–50)
HGB BLD-MCNC: 15.2 GM/DL (ref 14–18)
LDLC SERPL CALC-MCNC: 173.6 MG/DL (ref 63–159)
MCH RBC QN AUTO: 28.4 PG (ref 27–31)
MCHC RBC AUTO-ENTMCNC: 32.4 G/DL (ref 32–36)
MCV RBC AUTO: 88 FL (ref 82–98)
NONHDLC SERPL-MCNC: 203 MG/DL
PLATELET # BLD AUTO: 294 K/UL (ref 150–450)
PMV BLD AUTO: 10.1 FL (ref 9.2–12.9)
POTASSIUM SERPL-SCNC: 4.5 MMOL/L (ref 3.5–5.1)
PROT SERPL-MCNC: 8.1 GM/DL (ref 6–8.4)
RBC # BLD AUTO: 5.36 M/UL (ref 4.6–6.2)
SODIUM SERPL-SCNC: 137 MMOL/L (ref 136–145)
T4 FREE SERPL-MCNC: 0.96 NG/DL (ref 0.71–1.51)
TRIGL SERPL-MCNC: 147 MG/DL (ref 30–150)
TSH SERPL-ACNC: 1.38 UIU/ML (ref 0.4–4)
WBC # BLD AUTO: 7.42 K/UL (ref 3.9–12.7)

## 2025-04-07 PROCEDURE — 99999 PR PBB SHADOW E&M-EST. PATIENT-LVL III: CPT | Mod: PBBFAC,,, | Performed by: FAMILY MEDICINE

## 2025-04-07 PROCEDURE — 99395 PREV VISIT EST AGE 18-39: CPT | Mod: S$GLB,,, | Performed by: FAMILY MEDICINE

## 2025-04-07 PROCEDURE — 3078F DIAST BP <80 MM HG: CPT | Mod: CPTII,S$GLB,, | Performed by: FAMILY MEDICINE

## 2025-04-07 PROCEDURE — 85027 COMPLETE CBC AUTOMATED: CPT

## 2025-04-07 PROCEDURE — 3008F BODY MASS INDEX DOCD: CPT | Mod: CPTII,S$GLB,, | Performed by: FAMILY MEDICINE

## 2025-04-07 PROCEDURE — 36415 COLL VENOUS BLD VENIPUNCTURE: CPT | Mod: PN

## 2025-04-07 PROCEDURE — 84443 ASSAY THYROID STIM HORMONE: CPT

## 2025-04-07 PROCEDURE — 83036 HEMOGLOBIN GLYCOSYLATED A1C: CPT

## 2025-04-07 PROCEDURE — 3074F SYST BP LT 130 MM HG: CPT | Mod: CPTII,S$GLB,, | Performed by: FAMILY MEDICINE

## 2025-04-07 PROCEDURE — 84439 ASSAY OF FREE THYROXINE: CPT

## 2025-04-07 PROCEDURE — 82977 ASSAY OF GGT: CPT

## 2025-04-07 PROCEDURE — 80061 LIPID PANEL: CPT

## 2025-04-07 PROCEDURE — 1160F RVW MEDS BY RX/DR IN RCRD: CPT | Mod: CPTII,S$GLB,, | Performed by: FAMILY MEDICINE

## 2025-04-07 PROCEDURE — 80053 COMPREHEN METABOLIC PANEL: CPT

## 2025-04-07 PROCEDURE — 1159F MED LIST DOCD IN RCRD: CPT | Mod: CPTII,S$GLB,, | Performed by: FAMILY MEDICINE

## 2025-04-07 NOTE — PROGRESS NOTES
Chief Complaint  Chief Complaint   Patient presents with    Follow-up     6 month        HPI  Desean Kent is a 40 y.o. male with multiple medical diagnoses as listed in the medical history and problem list that presents for  in person visit.     History of Present Illness    CHIEF COMPLAINT:  - Patient presents for a 6-month follow-up visit to review previously elevated liver enzymes and discuss general health status.    HPI:  Patient is a 40-year-old male returning for follow-up after 6 months. He previously had elevated liver enzymes, which have since normalized based on tests conducted between April and October. He reports ongoing knee pain, which he attributes to aging and prior injuries from playing soccer in his youth. The pain is constant and persistent. He has not been taking any pain medication, including Tylenol or anti-inflammatories, for his knee pain. He mentions mild allergies recently. He discontinued medical marijuana use approximately 4 months ago.    He denies any recent lifestyle changes, major changes to his vision, or having been sick with COVID, flu, or any other significant illness recently.    MEDICATIONS:  - Discontinued medical marijuana 4 months ago    PMH:  - Elevated liver enzymes: Past    SOCIAL HISTORY:  - Marital status:          [facility]    10/3/2024  2:56 PM CDT - Filed by Patient   This questionnaire should take approximately 5 to 10 minutes to complete.  To begin, press Let's Begin and then press Continue. Let's Begin   On average, how many days per week do you engage in moderate to strenuous exercise (like a brisk walk)? 5 days   On average, how many minutes do you engage in exercise at this level? 90 min   Do you feel stress - tense, restless, nervous, or anxious, or unable to sleep at night because your mind is troubled all the time - these days? Only a little   How often do you feel lonely or isolated from those around you? Rarely   How often do you need to have  someone help you when you read instructions, pamphlets, or other written material from your doctor or pharmacy? Never   How hard is it for you to pay for the very basics like food, housing, medical care, and heating? Not hard at all   In the past 12 months has the electric, gas, oil, or water company threatened to shut off services in your home? No   Within the past 12 months, you worried that your food would run out before you got the money to buy more. Never true   Within the past 12 months, the food you bought just didn't last and you didn't have money to get more. Never true   Has the lack of transportation kept you from medical appointments, meetings, work or from getting things needed for daily living? No   In the last 12 months, was there a time when you were not able to pay the mortgage or rent on time? No   In the last 12 months, was there a time when you did not have a steady place to sleep or slept in a shelter (including now)? No   How often do you have a drink containing alcohol? 2-4 times a month   How many drinks containing alcohol do you have on a typical day when you are drinking? 3 or 4   How often do you have six or more drinks on one occasion? Less than monthly            Pmh, Psh, Family Hx, Social Hx, HM updated in Epic Tabs today.    Review of Systems   Constitutional:  Negative for activity change, appetite change, chills, fatigue and unexpected weight change.   HENT:  Negative for congestion, ear pain, postnasal drip, sneezing, sore throat and trouble swallowing.    Eyes:  Negative for pain and visual disturbance.   Respiratory:  Negative for cough and shortness of breath.    Cardiovascular:  Negative for chest pain and leg swelling.   Gastrointestinal:  Negative for abdominal pain, constipation, diarrhea, nausea and vomiting.   Endocrine: Negative for cold intolerance and heat intolerance.   Genitourinary:  Negative for difficulty urinating, dysuria and flank pain.   Musculoskeletal:   "Negative for arthralgias, back pain, joint swelling and neck pain.   Skin:  Negative for color change and rash.   Neurological:  Negative for dizziness, seizures and headaches.   Psychiatric/Behavioral:  Negative for behavioral problems, dysphoric mood and sleep disturbance. The patient is not nervous/anxious.         Objective:     Vitals:    04/07/25 0952   BP: 110/76   BP Location: Right arm   Patient Position: Sitting   Pulse: 62   Resp: 19   Temp: 97.7 °F (36.5 °C)   TempSrc: Tympanic   SpO2: 98%   Weight: 67.9 kg (149 lb 11.1 oz)   Height: 5' 7" (1.702 m)     Wt Readings from Last 10 Encounters:   04/07/25 67.9 kg (149 lb 11.1 oz)   04/16/24 71 kg (156 lb 8.4 oz)   09/22/22 63.2 kg (139 lb 5.3 oz)   09/21/22 64.9 kg (143 lb 1.3 oz)   09/19/22 64.9 kg (143 lb 1.3 oz)   08/22/22 63.3 kg (139 lb 8.8 oz)   08/16/22 62 kg (136 lb 11 oz)   12/16/19 66.3 kg (146 lb 2.6 oz)   09/24/19 66.3 kg (146 lb 2.6 oz)   02/02/18 69.6 kg (153 lb 7 oz)     Physical Exam    Mouth: Normal mouth.  TEST RESULTS:  - Liver enzymes: April, elevated  - Liver enzymes: October, back to normal       Physical Exam  Vitals reviewed.   Constitutional:       General: He is not in acute distress.     Appearance: Normal appearance. He is well-developed and normal weight.   HENT:      Head: Normocephalic and atraumatic.      Right Ear: Tympanic membrane and external ear normal.      Left Ear: Tympanic membrane and external ear normal.      Nose: Nose normal.      Mouth/Throat:      Mouth: Mucous membranes are moist.      Pharynx: Oropharynx is clear.   Eyes:      Conjunctiva/sclera: Conjunctivae normal.      Pupils: Pupils are equal, round, and reactive to light.   Neck:      Thyroid: No thyromegaly.   Cardiovascular:      Rate and Rhythm: Normal rate and regular rhythm.      Heart sounds: No murmur heard.     No friction rub. No gallop.   Pulmonary:      Effort: Pulmonary effort is normal. No respiratory distress.      Breath sounds: Normal " breath sounds.   Abdominal:      General: Bowel sounds are normal. There is no distension.      Palpations: Abdomen is soft.      Tenderness: There is no abdominal tenderness. There is no rebound.   Musculoskeletal:         General: Normal range of motion.      Cervical back: Normal range of motion and neck supple.   Lymphadenopathy:      Cervical: No cervical adenopathy.   Skin:     General: Skin is warm and dry.   Neurological:      General: No focal deficit present.      Mental Status: He is alert and oriented to person, place, and time.      Coordination: Coordination normal.   Psychiatric:         Attention and Perception: Attention normal.         Mood and Affect: Mood and affect normal.         Speech: Speech normal.         Behavior: Behavior normal.         Thought Content: Thought content normal.         Cognition and Memory: Cognition normal.         Judgment: Judgment normal.         Assessment:   LABS:   Lab Results   Component Value Date    HGBA1C 5.4 04/16/2024      Lab Results   Component Value Date    CHOL 232 (H) 04/16/2024     Lab Results   Component Value Date    LDLCALC 144.0 04/16/2024     Lab Results   Component Value Date    WBC 6.24 04/16/2024    HGB 15.5 04/16/2024    HCT 47.6 04/16/2024     04/16/2024    CHOL 232 (H) 04/16/2024    TRIG 195 (H) 04/16/2024    HDL 49 04/16/2024    ALT 35 10/11/2024    AST 24 10/11/2024     04/16/2024    K 4.0 04/16/2024     04/16/2024    CREATININE 1.1 04/16/2024    BUN 9 04/16/2024    CO2 25 04/16/2024    TSH 1.502 04/16/2024    HGBA1C 5.4 04/16/2024       Plan:   Assessment & Plan    Z00.00 Routine general medical exam at a health care facility    IMPRESSION:  Reviewed history of elevated liver enzymes, noting normalization after lifestyle changes.  Knee pain likely age-related.  Noted cessation of medical marijuana use.  Overall health status stable, transitioning to regular annual follow-up.  Discussed potential for slightly elevated  potassium in lab results due to recent fruit consumption, deemed clinically insignificant if present.    PLAN SUMMARY:  - Ordered comprehensive labs  - Administered tetanus vaccination  - Discussed importance of tetanus vaccine for whooping cough protection  - Follow-up in 1 year if lab results are as expected    GENERAL MEDICAL EXAMINATION AT A HEALTH CARE FACILITY:  - Explained importance of tetanus vaccination, particularly in relation to travel and potential future family planning.  - Discussed role of tetanus vaccine in providing whooping cough protection for herd immunity, especially important for protecting infants.  - Ordered comprehensive labs.  - Follow up in 1 year if lab results are as anticipated.       Desean was seen today for follow-up.    Diagnoses and all orders for this visit:    Routine general medical examination at a health care facility  -     Hemoglobin A1C; Future  -     CBC Without Differential; Future  -     Comprehensive Metabolic Panel; Future  -     TSH; Future  -     T4, Free; Future  -     Lipid Panel; Future  -     Gamma GT; Future    Elevated ALT measurement        X-Ray Lumbar Spine Ap And Lateral  Narrative: EXAM: XR LUMBAR SPINE AP AND LATERAL    CLINICAL HISTORY: Back pain.    FINDINGS:     3 views.    There is mild levocurvature at the thoracolumbar junction.     No fractures or dislocations.     Vertebral body heights appear normal.     Disc space heights are normal.  Impression: Mild levocurvature at the thoracolumbar junction.  Otherwise unremarkable study.    Finalized on: 4/16/2024 12:06 PM By:  Jett Gomez MD  [facility]# [id number]      2024-04-16 12:08:[id number]    BRVANDANA    The ASCVD Risk score (Araceli ROSSI, et al., 2019) failed to calculate for the following reasons:    The 2019 ASCVD risk score is only valid for ages 40 to 79    Follow-up: Follow up in about 1 year (around 4/7/2026) for physical with Dr SY.    I spent a total of   30    minutes face to face and non-face to face on  the date of this visit.This includes time preparing to see the patient (eg, review of tests, notes), obtaining and/or reviewing additional history from an independent historian and/or outside medical records, documenting clinical information in the electronic health record, independently interpreting results and/or communicating results to the patient/family/caregiver, or care coordinator.  Visit today included increased complexity associated with the care of the episodic problem addressed and managing the longitudinal care of the patient due to the serious and/or complex managed problem(s).    This note was generated with the assistance of ambient listening technology. Verbal consent was obtained by the patient and accompanying visitor(s) for the recording of patient appointment to facilitate this note. I attest to having reviewed and edited the generated note for accuracy, though some syntax or spelling errors may persist. Please contact the author of this note for any clarification.       There are no Patient Instructions on file for this visit.

## 2025-04-10 ENCOUNTER — RESULTS FOLLOW-UP (OUTPATIENT)
Dept: PRIMARY CARE CLINIC | Facility: CLINIC | Age: 40
End: 2025-04-10

## 2025-04-10 NOTE — PROGRESS NOTES
Results released to patient portal. Needs to begin statin medication for cholesterol. Can do over virtual visit or just send in medication if patient is willing. If desires an appointment please setup with [facility]. Thanks. Karely Marsh MD

## 2025-05-20 ENCOUNTER — OFFICE VISIT (OUTPATIENT)
Dept: PRIMARY CARE CLINIC | Facility: CLINIC | Age: 40
End: 2025-05-20
Payer: COMMERCIAL

## 2025-05-20 VITALS
TEMPERATURE: 97 F | HEART RATE: 57 BPM | OXYGEN SATURATION: 97 % | DIASTOLIC BLOOD PRESSURE: 76 MMHG | WEIGHT: 151.56 LBS | SYSTOLIC BLOOD PRESSURE: 122 MMHG | BODY MASS INDEX: 23.74 KG/M2

## 2025-05-20 DIAGNOSIS — G89.29 CHRONIC LEFT-SIDED LOW BACK PAIN WITHOUT SCIATICA: Primary | ICD-10-CM

## 2025-05-20 DIAGNOSIS — M54.15 RADICULOPATHY OF THORACOLUMBAR REGION: ICD-10-CM

## 2025-05-20 DIAGNOSIS — M54.50 CHRONIC LEFT-SIDED LOW BACK PAIN WITHOUT SCIATICA: Primary | ICD-10-CM

## 2025-05-20 DIAGNOSIS — E78.5 DYSLIPIDEMIA, GOAL LDL BELOW 130: ICD-10-CM

## 2025-05-20 PROCEDURE — 3074F SYST BP LT 130 MM HG: CPT | Mod: CPTII,S$GLB,, | Performed by: NURSE PRACTITIONER

## 2025-05-20 PROCEDURE — 1159F MED LIST DOCD IN RCRD: CPT | Mod: CPTII,S$GLB,, | Performed by: NURSE PRACTITIONER

## 2025-05-20 PROCEDURE — 99999 PR PBB SHADOW E&M-EST. PATIENT-LVL III: CPT | Mod: PBBFAC,,, | Performed by: NURSE PRACTITIONER

## 2025-05-20 PROCEDURE — 3044F HG A1C LEVEL LT 7.0%: CPT | Mod: CPTII,S$GLB,, | Performed by: NURSE PRACTITIONER

## 2025-05-20 PROCEDURE — 99214 OFFICE O/P EST MOD 30 MIN: CPT | Mod: S$GLB,,, | Performed by: NURSE PRACTITIONER

## 2025-05-20 PROCEDURE — 3008F BODY MASS INDEX DOCD: CPT | Mod: CPTII,S$GLB,, | Performed by: NURSE PRACTITIONER

## 2025-05-20 PROCEDURE — 1160F RVW MEDS BY RX/DR IN RCRD: CPT | Mod: CPTII,S$GLB,, | Performed by: NURSE PRACTITIONER

## 2025-05-20 PROCEDURE — 3078F DIAST BP <80 MM HG: CPT | Mod: CPTII,S$GLB,, | Performed by: NURSE PRACTITIONER

## 2025-05-20 RX ORDER — DICLOFENAC SODIUM 75 MG/1
75 TABLET, DELAYED RELEASE ORAL 2 TIMES DAILY PRN
Qty: 60 TABLET | Refills: 0 | Status: SHIPPED | OUTPATIENT
Start: 2025-05-20

## 2025-05-20 RX ORDER — METHOCARBAMOL 500 MG/1
500 TABLET, FILM COATED ORAL NIGHTLY PRN
Qty: 30 TABLET | Refills: 0 | Status: SHIPPED | OUTPATIENT
Start: 2025-05-20

## 2025-05-20 NOTE — PROGRESS NOTES
Patient ID: Desean Kent is a 40 y.o. male.    Chief Complaint: Low-back Pain    History of Present Illness    Mr. Kent presents today for back pain.    He reports back pain that started 2-3 weeks ago after a work injury, with significant worsening since Friday. He describes a dull pain above the left hip with associated spasms, numbness, and tingling on the left side. Pain severity is 3/10 at rest, increasing to 8/10 with position changes or during muscle spasms. Over-the-counter Tylenol and ibuprofen have not provided relief.    He works in a laboratory environment moving heavy cylinders. While dollies are available, he acknowledges inconsistent use of proper body mechanics.    He experiences elbow and knee pain upon morning awakening. He maintains daily walking, sports activities, and morning stretching exercises.    Cholesterol is elevated. Triglycerides have improved from 195 one year ago. Kidney function, liver function, thyroid studies, and diabetes screening are normal.      ROS:  General: -fever, -chills, -fatigue, -weight gain, -weight loss  Eyes: -vision changes, -redness, -discharge  ENT: -ear pain, -nasal congestion, -sore throat  Cardiovascular: -chest pain, -palpitations, -lower extremity edema  Respiratory: -cough, -shortness of breath  Gastrointestinal: -abdominal pain, -nausea, -vomiting, -diarrhea, -constipation, -blood in stool  Genitourinary: -dysuria, -hematuria, -frequency  Musculoskeletal: +joint pain, -muscle pain, +back pain, +muscle spasms  Skin: -rash, -lesion  Neurological: -headache, -dizziness, +numbness, +tingling  Psychiatric: -anxiety, -depression, -sleep difficulty         Wt Readings from Last 10 Encounters:   05/20/25 68.8 kg (151 lb 9.1 oz)   04/07/25 67.9 kg (149 lb 11.1 oz)   04/16/24 71 kg (156 lb 8.4 oz)   09/22/22 63.2 kg (139 lb 5.3 oz)   09/21/22 64.9 kg (143 lb 1.3 oz)   09/19/22 64.9 kg (143 lb 1.3 oz)   08/22/22 63.3 kg (139 lb 8.8 oz)   08/16/22 62 kg (136 lb 11 oz)    12/16/19 66.3 kg (146 lb 2.6 oz)   09/24/19 66.3 kg (146 lb 2.6 oz)       The ASCVD Risk score (Araceli ROSSI, et al., 2019) failed to calculate for the following reasons:    The 2019 ASCVD risk score is only valid for ages 40 to 79    PAST MEDICAL HISTORY:  History reviewed. No pertinent past medical history.    PAST SURGICAL HISTORY:  History reviewed. No pertinent surgical history.    SOCIAL HISTORY:  Social History[1]    FAMILY HISTORY:  Family History   Problem Relation Name Age of Onset    Hypertension Mother Nevin     Hypertension Father Jd     Cancer Father Jd     Asthma Sister Cha     Diabetes Maternal Grandmother Donna        ALLERGIES AND MEDICATIONS: updated and reviewed.  Review of patient's allergies indicates:  No Known Allergies  Current Medications[2]      Physical Exam  Constitutional:       Appearance: Normal appearance.   HENT:      Head: Normocephalic and atraumatic.      Right Ear: Tympanic membrane normal.      Left Ear: Tympanic membrane normal.      Nose: Nose normal.      Mouth/Throat:      Mouth: Mucous membranes are moist.   Eyes:      Pupils: Pupils are equal, round, and reactive to light.   Cardiovascular:      Rate and Rhythm: Normal rate.      Pulses: Normal pulses.   Pulmonary:      Effort: Pulmonary effort is normal.   Abdominal:      General: Bowel sounds are normal.   Musculoskeletal:      Cervical back: Normal range of motion.      Thoracic back: Spasms and tenderness present.      Lumbar back: Spasms and tenderness present. Decreased range of motion.   Skin:     General: Skin is warm and dry.      Capillary Refill: Capillary refill takes less than 2 seconds.   Neurological:      Mental Status: He is alert and oriented to person, place, and time.   Psychiatric:         Mood and Affect: Mood normal.          Assessment:   LABS:   Lab Results   Component Value Date    HGBA1C 5.5 04/07/2025    HGBA1C 5.4 04/16/2024      Lab Results   Component Value Date    CHOL 257 (H)  04/07/2025    CHOL 232 (H) 04/16/2024     Lab Results   Component Value Date    LDLCALC 173.6 (H) 04/07/2025    LDLCALC 144.0 04/16/2024     Lab Results   Component Value Date    WBC 7.42 04/07/2025    HGB 15.2 04/07/2025    HCT 46.9 04/07/2025     04/07/2025    CHOL 257 (H) 04/07/2025    TRIG 147 04/07/2025    HDL 54 04/07/2025    ALT 31 04/07/2025    AST 24 04/07/2025     04/07/2025    K 4.5 04/07/2025     04/07/2025    CREATININE 1.2 04/07/2025    BUN 15 04/07/2025    CO2 23 04/07/2025    TSH 1.382 04/07/2025    HGBA1C 5.5 04/07/2025       Plan:   Desean was seen today for low-back pain.    Diagnoses and all orders for this visit:    Chronic left-sided low back pain without sciatica  -     diclofenac (VOLTAREN) 75 MG EC tablet; Take 1 tablet (75 mg total) by mouth 2 (two) times daily as needed.  -     methocarbamoL (ROBAXIN) 500 MG Tab; Take 1 tablet (500 mg total) by mouth nightly as needed.    Radiculopathy of thoracolumbar region  -     diclofenac (VOLTAREN) 75 MG EC tablet; Take 1 tablet (75 mg total) by mouth 2 (two) times daily as needed.  -     methocarbamoL (ROBAXIN) 500 MG Tab; Take 1 tablet (500 mg total) by mouth nightly as needed.    Dyslipidemia, goal LDL below 130  -     Lipid Panel; Future        Assessment & Plan      IMPRESSION:  -Assessed back pain, noting recent onset of significant pain.  -Evaluated range of motion and pain levels during exam.  -Reviewed XR results showing mild levo curvature at thoracolumbar junction.  -Considered overuse or improper body mechanics as potential causes.  -Assessed cholesterol levels, noting elevated LDL and triglycerides.  -Initiated dietary modifications before considering medication for cholesterol management.    LOW BACK PAIN:  - Prescribed diclofenac 75 mg twice daily for the first 72 hours, then as needed up to twice daily thereafter for back pain.  - Prescribed Robaxin 500 mg nightly as needed for muscle relaxation.  - Explained  difference between acetaminophen and anti-inflammatory medications, proper dosing of NSAIDs for pain management, and potential side effects including gastric irritation and need for hydration.  - Mr. Kent reports dull pain above the hip with spasm that started Friday, rated 8/10 during movement and 3/10 at rest.  - Mr. Kent has difficulty balancing on right leg and pain during certain movements.  - Lumbar x-ray shows mild levo curvature.  - Consider physical therapy if condition persists or worsens.    RADICULOPATHY, LUMBAR REGION:  - Mr. Kent reports numbness and tingling on the left side with back pain on the left side.  - Mr. Kent has difficulty with certain movements and pain on left side.  - Lumbar x-ray shows mild levo curvature.  - Prescribed diclofenac 75 mg twice daily for the first 72 hours, then as needed up to twice daily thereafter.  - Prescribed Robaxin 500 mg nightly as needed for muscle relaxation.  - Consider physical therapy if condition persists or worsens.    MUSCLE SPASM OF BACK:  - Prescribed Robaxin (methocarbamol) 500 mg nightly as needed for muscle relaxation.  - Mr. Kent reports spasm started Friday with pain that seizes up.    PAIN IN LEFT HIP:  - Mr. Kent reports dull pain above the hip on the left side, rated 8/10 during movement and 3/10 at rest.  - Mr. Kent has difficulty with certain movements.  - Lumbar x-ray shows mild levo curvature.  - Prescribed diclofenac 75 mg twice daily for the first 72 hours, then as needed.  - Prescribed Robaxin 500 mg nightly as needed for muscle relaxation.  - Consider physical therapy if condition persists or worsens.    PARESTHESIA OF LEFT LOWER EXTREMITY:  - Mr. Kent reports numbness and tingling on the left side.  - Mr. Kent has difficulty with certain movements and pain on left side.  - Lumbar x-ray shows mild levo curvature.  - Prescribed diclofenac 75 mg twice daily for the first 72 hours, then as needed.  - Prescribed Robaxin 500 mg nightly as  needed for muscle relaxation.  - Consider physical therapy if condition persists or worsens.    HYPERLIPIDEMIA:  - Educated patient on the significance of different cholesterol components (HDL, LDL, triglycerides), their impact on health, and relationship between diet and cholesterol levels.  - Mr. Kent reports cholesterol was previously 195, current level close to 150, with a goal below 100.  - Mr. Kent's HDL is almost 55, LDL was 144 and now 173, triglycerides upper limit is 155 with a goal below 130.  - Advised to increase vegetable consumption with each meal, creating colorful plates with variety.  - Recommend decreasing rice, pasta, and other high-carbohydrate foods with reduced portion sizes.  - Mr. Kent to continue daily walking and sports activities.  - Ordered fasting lipid panel to be completed in 8 weeks f  or follow-up evaluation.    WORK-RELATED PHYSICAL STRAIN:  - Mr. Kent reports moving heavy cylinders at work with potential improper body mechanics.  - Evaluated work-related activities and discussed potential overuse and improper body mechanics.  - Consider physical therapy if condition persists or worsens.       FOLLOW-UP:  - RTC for worsening or ongoing symptoms.       I spent a total of 31 minutes face to face and non-face to face on the date of this visit.This includes time preparing to see the patient (eg, review of tests, notes), obtaining and/or reviewing additional history from an independent historian and/or outside medical records, documenting clinical information in the electronic health record, independently interpreting results and/or communicating results to the patient/family/caregiver, or care coordinator.  Visit today included increased complexity associated with the care of the episodic problem addressed and managing the longitudinal care of the patient due to the serious and/or complex managed problem(s).      This note was generated with the assistance of ambient listening  technology. Verbal consent was obtained by the patient and accompanying visitor(s) for the recording of patient appointment to facilitate this note. I attest to having reviewed and edited the generated note for accuracy, though some syntax or spelling errors may persist. Please contact the author of this note for any clarification.        Sincerely,  Osmin Davis NP          [1]   Social History  Socioeconomic History    Marital status: Single   Tobacco Use    Smoking status: Former     Current packs/day: 0.00     Average packs/day: 0.3 packs/day for 15.0 years (3.8 ttl pk-yrs)     Types: Cigarettes     Quit date: 2022     Years since quitting: 3.3     Passive exposure: Past    Smokeless tobacco: Never   Substance and Sexual Activity    Alcohol use: Yes    Drug use: Yes     Frequency: 3.0 times per week     Types: Marijuana     Comment: [employer]    Sexual activity: Yes     Partners: Female     Birth control/protection: Coitus interruptus     Social Drivers of Health     Financial Resource Strain: Low Risk  (5/19/2025)    Overall Financial Resource Strain (CARDIA)     Difficulty of Paying Living Expenses: Not hard at all   Food Insecurity: No Food Insecurity (5/19/2025)    Hunger Vital Sign     Worried About Running Out of Food in the Last Year: Never true     Ran Out of Food in the Last Year: Never true   Transportation Needs: No Transportation Needs (5/19/2025)    PRAPARE - Transportation     Lack of Transportation (Medical): No     Lack of Transportation (Non-Medical): No   Physical Activity: Insufficiently Active (5/19/2025)    Exercise Vital Sign     Days of Exercise per Week: 3 days     Minutes of Exercise per Session: 30 min   Stress: No Stress Concern Present (5/19/2025)    [employer] [place] [employer] - Occupational Stress Questionnaire     Feeling of Stress : Not at all   Housing Stability: Low Risk  (5/19/2025)    Housing Stability Vital Sign     Unable to Pay for Housing in the Last  Year: No     Number of Times Moved in the Last Year: 1     Homeless in the Last Year: No   [2]   Current Outpatient Medications   Medication Sig Dispense Refill    diclofenac (VOLTAREN) 75 MG EC tablet Take 1 tablet (75 mg total) by mouth 2 (two) times daily as needed. 60 tablet 0    methocarbamoL (ROBAXIN) 500 MG Tab Take 1 tablet (500 mg total) by mouth nightly as needed. 30 tablet 0     No current facility-administered medications for this visit.

## 2025-07-15 ENCOUNTER — LAB VISIT (OUTPATIENT)
Dept: LAB | Facility: HOSPITAL | Age: 40
End: 2025-07-15
Attending: NURSE PRACTITIONER
Payer: COMMERCIAL

## 2025-07-15 DIAGNOSIS — E78.5 DYSLIPIDEMIA, GOAL LDL BELOW 130: ICD-10-CM

## 2025-07-15 LAB
CHOLEST SERPL-MCNC: 251 MG/DL (ref 120–199)
CHOLEST/HDLC SERPL: 6 {RATIO} (ref 2–5)
HDLC SERPL-MCNC: 42 MG/DL (ref 40–75)
HDLC SERPL: 16.7 % (ref 20–50)
LDLC SERPL CALC-MCNC: 183 MG/DL (ref 63–159)
NONHDLC SERPL-MCNC: 209 MG/DL
TRIGL SERPL-MCNC: 130 MG/DL (ref 30–150)

## 2025-07-15 PROCEDURE — 36415 COLL VENOUS BLD VENIPUNCTURE: CPT | Mod: PN

## 2025-07-15 PROCEDURE — 80061 LIPID PANEL: CPT

## 2025-07-17 ENCOUNTER — TELEPHONE (OUTPATIENT)
Dept: PRIMARY CARE CLINIC | Facility: CLINIC | Age: 40
End: 2025-07-17
Payer: COMMERCIAL

## 2025-07-17 NOTE — TELEPHONE ENCOUNTER
Spoke with the patient and scheduled him for VV on tomorrow with Osmin Davis for lab review/ updated plan of care. The patient understood information given.

## 2025-07-18 ENCOUNTER — OFFICE VISIT (OUTPATIENT)
Dept: PRIMARY CARE CLINIC | Facility: CLINIC | Age: 40
End: 2025-07-18
Payer: COMMERCIAL

## 2025-07-18 DIAGNOSIS — E78.5 DYSLIPIDEMIA, GOAL LDL BELOW 130: Primary | ICD-10-CM

## 2025-07-18 RX ORDER — PRAVASTATIN SODIUM 20 MG/1
20 TABLET ORAL DAILY
Qty: 90 TABLET | Refills: 3 | Status: SHIPPED | OUTPATIENT
Start: 2025-07-18 | End: 2026-07-18

## 2025-07-18 NOTE — PROGRESS NOTES
Patient ID: Desean Kent is a 40 y.o. male.    Chief Complaint: No chief complaint on file.    History of Present Illness    Mr. Kent presents today for follow up of elevated cholesterol.    He reports persistent elevated cholesterol despite previous dietary modification attempts. His current diet consists primarily of seafood, fish, and vegetables, with limited rice and pasta consumption (approximately once weekly). He self-prepares meals but continues to use butter in cooking. He remains motivated to address cholesterol management through lifestyle modifications.    He has a family history of high cholesterol.    He reports experiencing generalized joint pain occurring every morning.      ROS:  General: -fever, -chills, -fatigue, -weight gain, -weight loss  Eyes: -vision changes, -redness, -discharge  ENT: -ear pain, -nasal congestion, -sore throat  Cardiovascular: -chest pain, -palpitations, -lower extremity edema  Respiratory: -cough, -shortness of breath  Gastrointestinal: -abdominal pain, -nausea, -vomiting, -diarrhea, -constipation, -blood in stool  Genitourinary: -dysuria, -hematuria, -frequency  Musculoskeletal: +joint pain, -muscle pain  Skin: -rash, -lesion  Neurological: -headache, -dizziness, -numbness, -tingling  Psychiatric: -anxiety, -depression, -sleep difficulty       Wt Readings from Last 10 Encounters:   05/20/25 68.8 kg (151 lb 9.1 oz)   04/07/25 67.9 kg (149 lb 11.1 oz)   04/16/24 71 kg (156 lb 8.4 oz)   09/22/22 63.2 kg (139 lb 5.3 oz)   09/21/22 64.9 kg (143 lb 1.3 oz)   09/19/22 64.9 kg (143 lb 1.3 oz)   08/22/22 63.3 kg (139 lb 8.8 oz)   08/16/22 62 kg (136 lb 11 oz)   12/16/19 66.3 kg (146 lb 2.6 oz)   09/24/19 66.3 kg (146 lb 2.6 oz)       The ASCVD Risk score ([place]ac[place] DK, et al., 2019) failed to calculate for the following reasons:    The 2019 ASCVD risk score is only valid for ages 40 to 79    PAST MEDICAL HISTORY:  History reviewed. No pertinent past medical history.    PAST SURGICAL  HISTORY:  History reviewed. No pertinent surgical history.    SOCIAL HISTORY:  Social History[1]    FAMILY HISTORY:  Family History   Problem Relation Name Age of Onset    Hypertension Mother Nevin     Hypertension Father Jd     Cancer Father Jd     Asthma Sister Cha     Diabetes Maternal Grandmother Donna        ALLERGIES AND MEDICATIONS: updated and reviewed.  Review of patient's allergies indicates:  No Known Allergies  Current Medications[2]      Physical Exam  Constitutional:       Appearance: Normal appearance.   HENT:      Head: Normocephalic and atraumatic.   Pulmonary:      Effort: Pulmonary effort is normal.   Neurological:      Mental Status: He is alert and oriented to person, place, and time.   Psychiatric:         Mood and Affect: Mood normal.          Assessment:   LABS:   Lab Results   Component Value Date    HGBA1C 5.5 04/07/2025    HGBA1C 5.4 04/16/2024      Lab Results   Component Value Date    CHOL 251 (H) 07/15/2025    CHOL 257 (H) 04/07/2025    CHOL 232 (H) 04/16/2024     Lab Results   Component Value Date    LDLCALC 183.0 (H) 07/15/2025    LDLCALC 173.6 (H) 04/07/2025    LDLCALC 144.0 04/16/2024     Lab Results   Component Value Date    WBC 7.42 04/07/2025    HGB 15.2 04/07/2025    HCT 46.9 04/07/2025     04/07/2025    CHOL 251 (H) 07/15/2025    TRIG 130 07/15/2025    HDL 42 07/15/2025    ALT 31 04/07/2025    AST 24 04/07/2025     04/07/2025    K 4.5 04/07/2025     04/07/2025    CREATININE 1.2 04/07/2025    BUN 15 04/07/2025    CO2 23 04/07/2025    TSH 1.382 04/07/2025    HGBA1C 5.5 04/07/2025       Plan:   Diagnoses and all orders for this visit:    Dyslipidemia, goal LDL below 130  -     Lipoprotein A (LPA); Future  -     Lipid Panel; Future  -     pravastatin (PRAVACHOL) 20 MG tablet; Take 1 tablet (20 mg total) by mouth once daily.        Assessment & Plan      IMPRESSION:  -Elevated cholesterol persists despite dietary changes.  -Family history of high  cholesterol noted, potentially contributing to condition.  -Considered lipoprotein screening for further assessment.  -Started low potency, mid-dose statin therapy to lower LDL cholesterol.  -Considered future CT cardiac scoring to assess CAD risk, given age and risk factors.    HYPERLIPIDEMIA:  - Monitored the patient's cholesterol, which remains elevated despite dietary modifications.  - Given the ineffectiveness of diet changes and considering the patient's family history, prescribed a low potency, mid-dose statin therapy to lower LDL cholesterol.  - Explained potential side effects of statins, including muscle aches and joint pain, clarifying that statin-induced muscle pain is more generalized than typical joint pain and is more common with potent statins but does not affect everyone.  - Ordered fasting lipid panel in 12 weeks to reassess cholesterol levels.      JOINT PAIN:  - Monitored the patient who reports joint pain every morning.      FAMILY HISTORY OF ELEVATED LIPOPROTEIN(A):  - Assessed the patient's family history of high cholesterol, which may indicate elevated lipoprotein(a).      FOLLOW-UP:  - Repeat fasting labs in approx three months.  -VV with Dr. Marsh a week after fasting labs.      During this audiovisual appt patient appears to be sitting in his car.    Each patient to whom he or she provides medical services by telemedicine is:  (1) informed of the relationship between the health care provider and patient and the respective role of any other health care provider with respect to management of the patient; and (2) notified that he or she may decline to receive medical services by telemedicine and may withdraw from such care at any time.    I spent a total of 12 minutes face to face and non-face to face on the date of this visit.This includes time preparing to see the patient (eg, review of tests, notes), obtaining and/or reviewing additional history from an independent historian and/or outside  medical records, documenting clinical information in the electronic health record, independently interpreting results and/or communicating results to the patient/family/caregiver, or care coordinator.  Visit today included increased complexity associated with the care of the episodic problem addressed and managing the longitudinal care of the patient due to the serious and/or complex managed problem(s).      This note was generated with the assistance of ambient listening technology. Verbal consent was obtained by the patient and accompanying visitor(s) for the recording of patient appointment to facilitate this note. I attest to having reviewed and edited the generated note for accuracy, though some syntax or spelling errors may persist. Please contact the author of this note for any clarification.      Sincerely,  Osmin Davis NP   Answers submitted by the patient for this visit:  Review of Systems Questionnaire (Submitted on 7/18/2025)  activity change: No  unexpected weight change: No  neck pain: No  hearing loss: No  rhinorrhea: No  trouble swallowing: No  eye discharge: No  visual disturbance: No  chest tightness: No  wheezing: No  chest pain: No  palpitations: No  blood in stool: No  constipation: No  vomiting: No  diarrhea: No  polydipsia: No  polyuria: No  difficulty urinating: No  urgency: No  hematuria: No  joint swelling: No  arthralgias: Yes  headaches: No  weakness: No  confusion: No  dysphoric mood: No         [1]   Social History  Socioeconomic History    Marital status: Single   Tobacco Use    Smoking status: Former     Current packs/day: 0.00     Average packs/day: 0.3 packs/day for 15.0 years (3.8 ttl pk-yrs)     Types: Cigarettes     Quit date: 2022     Years since quitting: 3.5     Passive exposure: Past    Smokeless tobacco: Never   Substance and Sexual Activity    Alcohol use: Yes    Drug use: Yes     Frequency: 3.0 times per week     Types: Marijuana     Comment: [employer]    Sexual  activity: Yes     Partners: Female     Birth control/protection: Coitus interruptus     Social Drivers of Health     Financial Resource Strain: Low Risk  (5/19/2025)    Overall Financial Resource Strain (CARDIA)     Difficulty of Paying Living Expenses: Not hard at all   Food Insecurity: No Food Insecurity (5/19/2025)    Hunger Vital Sign     Worried About Running Out of Food in the Last Year: Never true     Ran Out of Food in the Last Year: Never true   Transportation Needs: No Transportation Needs (5/19/2025)    PRAPARE - Transportation     Lack of Transportation (Medical): No     Lack of Transportation (Non-Medical): No   Physical Activity: Insufficiently Active (5/19/2025)    Exercise Vital Sign     Days of Exercise per Week: 3 days     Minutes of Exercise per Session: 30 min   Stress: No Stress Concern Present (5/19/2025)    [employer] [place] - Occupational Stress Questionnaire     Feeling of Stress : Not at all   Housing Stability: Low Risk  (5/19/2025)    Housing Stability Vital Sign     Unable to Pay for Housing in the Last Year: No     Number of Times Moved in the Last Year: 1     Homeless in the Last Year: No   [2]   Current Outpatient Medications   Medication Sig Dispense Refill    pravastatin (PRAVACHOL) 20 MG tablet Take 1 tablet (20 mg total) by mouth once daily. 90 tablet 3     No current facility-administered medications for this visit.

## 2025-07-23 ENCOUNTER — TELEPHONE (OUTPATIENT)
Dept: PRIMARY CARE CLINIC | Facility: CLINIC | Age: 40
End: 2025-07-23
Payer: COMMERCIAL

## 2025-07-23 NOTE — TELEPHONE ENCOUNTER
Spoke with the pt and scheduled him an appointment on tomorrow Thursday, July 24th at 9:20 am with Osmin Davis regarding chest pain. The pt understood information given.

## 2025-07-24 ENCOUNTER — LAB VISIT (OUTPATIENT)
Dept: LAB | Facility: HOSPITAL | Age: 40
End: 2025-07-24
Attending: NURSE PRACTITIONER
Payer: COMMERCIAL

## 2025-07-24 ENCOUNTER — OFFICE VISIT (OUTPATIENT)
Dept: PRIMARY CARE CLINIC | Facility: CLINIC | Age: 40
End: 2025-07-24
Payer: COMMERCIAL

## 2025-07-24 VITALS
SYSTOLIC BLOOD PRESSURE: 128 MMHG | TEMPERATURE: 98 F | BODY MASS INDEX: 24.36 KG/M2 | HEART RATE: 62 BPM | WEIGHT: 155.19 LBS | DIASTOLIC BLOOD PRESSURE: 76 MMHG | HEIGHT: 67 IN | OXYGEN SATURATION: 98 %

## 2025-07-24 DIAGNOSIS — F43.21 GRIEF ASSOCIATED WITH LOSS OF FETUS: ICD-10-CM

## 2025-07-24 DIAGNOSIS — Z82.49 FAMILY HISTORY OF HEART DISEASE: ICD-10-CM

## 2025-07-24 DIAGNOSIS — R07.9 CHEST PAIN, UNSPECIFIED TYPE: Primary | ICD-10-CM

## 2025-07-24 DIAGNOSIS — E78.5 DYSLIPIDEMIA, GOAL LDL BELOW 130: ICD-10-CM

## 2025-07-24 DIAGNOSIS — Z78.9 STATIN INTOLERANCE: ICD-10-CM

## 2025-07-24 DIAGNOSIS — M19.90 CHRONIC ARTHRITIS: ICD-10-CM

## 2025-07-24 DIAGNOSIS — F41.1 GAD (GENERALIZED ANXIETY DISORDER): ICD-10-CM

## 2025-07-24 DIAGNOSIS — F41.9 MILD ANXIETY: ICD-10-CM

## 2025-07-24 DIAGNOSIS — R07.9 CHEST PAIN, UNSPECIFIED TYPE: ICD-10-CM

## 2025-07-24 LAB
ABSOLUTE EOSINOPHIL (OHS): 0.07 K/UL
ABSOLUTE MONOCYTE (OHS): 0.8 K/UL (ref 0.3–1)
ABSOLUTE NEUTROPHIL COUNT (OHS): 4.25 K/UL (ref 1.8–7.7)
ALBUMIN SERPL BCP-MCNC: 4.6 G/DL (ref 3.5–5.2)
ALP SERPL-CCNC: 63 UNIT/L (ref 40–150)
ALT SERPL W/O P-5'-P-CCNC: 36 UNIT/L (ref 10–44)
ANION GAP (OHS): 9 MMOL/L (ref 8–16)
AST SERPL-CCNC: 29 UNIT/L (ref 11–45)
BASOPHILS # BLD AUTO: 0.07 K/UL
BASOPHILS NFR BLD AUTO: 1 %
BILIRUB SERPL-MCNC: 1.1 MG/DL (ref 0.1–1)
BILIRUB UR QL STRIP.AUTO: NEGATIVE
BUN SERPL-MCNC: 14 MG/DL (ref 6–20)
CALCIUM SERPL-MCNC: 9.7 MG/DL (ref 8.7–10.5)
CHLORIDE SERPL-SCNC: 106 MMOL/L (ref 95–110)
CK SERPL-CCNC: 269 U/L (ref 20–200)
CLARITY UR: CLEAR
CO2 SERPL-SCNC: 27 MMOL/L (ref 23–29)
COLOR UR AUTO: ABNORMAL
CREAT SERPL-MCNC: 1.3 MG/DL (ref 0.5–1.4)
ERYTHROCYTE [DISTWIDTH] IN BLOOD BY AUTOMATED COUNT: 13.3 % (ref 11.5–14.5)
GFR SERPLBLD CREATININE-BSD FMLA CKD-EPI: >60 ML/MIN/1.73/M2
GLUCOSE SERPL-MCNC: 79 MG/DL (ref 70–110)
GLUCOSE UR QL STRIP: NEGATIVE
HCT VFR BLD AUTO: 46.8 % (ref 40–54)
HGB BLD-MCNC: 15.6 GM/DL (ref 14–18)
HGB UR QL STRIP: ABNORMAL
IMM GRANULOCYTES # BLD AUTO: 0.01 K/UL (ref 0–0.04)
IMM GRANULOCYTES NFR BLD AUTO: 0.1 % (ref 0–0.5)
KETONES UR QL STRIP: NEGATIVE
LEUKOCYTE ESTERASE UR QL STRIP: NEGATIVE
LIPASE SERPL-CCNC: 23 U/L (ref 4–60)
LYMPHOCYTES # BLD AUTO: 2.14 K/UL (ref 1–4.8)
MCH RBC QN AUTO: 29.2 PG (ref 27–31)
MCHC RBC AUTO-ENTMCNC: 33.3 G/DL (ref 32–36)
MCV RBC AUTO: 88 FL (ref 82–98)
NITRITE UR QL STRIP: NEGATIVE
NUCLEATED RBC (/100WBC) (OHS): 0 /100 WBC
PH UR STRIP: 6 [PH]
PLATELET # BLD AUTO: 284 K/UL (ref 150–450)
PMV BLD AUTO: 9.5 FL (ref 9.2–12.9)
POTASSIUM SERPL-SCNC: 4.1 MMOL/L (ref 3.5–5.1)
PROT SERPL-MCNC: 8 GM/DL (ref 6–8.4)
PROT UR QL STRIP: NEGATIVE
RBC # BLD AUTO: 5.35 M/UL (ref 4.6–6.2)
RELATIVE EOSINOPHIL (OHS): 1 %
RELATIVE LYMPHOCYTE (OHS): 29.2 % (ref 18–48)
RELATIVE MONOCYTE (OHS): 10.9 % (ref 4–15)
RELATIVE NEUTROPHIL (OHS): 57.8 % (ref 38–73)
SODIUM SERPL-SCNC: 142 MMOL/L (ref 136–145)
SP GR UR STRIP: 1.02
TROPONIN I SERPL HS-MCNC: <3 NG/L
WBC # BLD AUTO: 7.34 K/UL (ref 3.9–12.7)

## 2025-07-24 PROCEDURE — 1159F MED LIST DOCD IN RCRD: CPT | Mod: CPTII,S$GLB,, | Performed by: NURSE PRACTITIONER

## 2025-07-24 PROCEDURE — 3074F SYST BP LT 130 MM HG: CPT | Mod: CPTII,S$GLB,, | Performed by: NURSE PRACTITIONER

## 2025-07-24 PROCEDURE — 1160F RVW MEDS BY RX/DR IN RCRD: CPT | Mod: CPTII,S$GLB,, | Performed by: NURSE PRACTITIONER

## 2025-07-24 PROCEDURE — 3078F DIAST BP <80 MM HG: CPT | Mod: CPTII,S$GLB,, | Performed by: NURSE PRACTITIONER

## 2025-07-24 PROCEDURE — 3008F BODY MASS INDEX DOCD: CPT | Mod: CPTII,S$GLB,, | Performed by: NURSE PRACTITIONER

## 2025-07-24 PROCEDURE — 36415 COLL VENOUS BLD VENIPUNCTURE: CPT

## 2025-07-24 PROCEDURE — 99999 PR PBB SHADOW E&M-EST. PATIENT-LVL IV: CPT | Mod: PBBFAC,,, | Performed by: NURSE PRACTITIONER

## 2025-07-24 PROCEDURE — 85025 COMPLETE CBC W/AUTO DIFF WBC: CPT

## 2025-07-24 PROCEDURE — 84484 ASSAY OF TROPONIN QUANT: CPT

## 2025-07-24 PROCEDURE — 99215 OFFICE O/P EST HI 40 MIN: CPT | Mod: S$GLB,,, | Performed by: NURSE PRACTITIONER

## 2025-07-24 PROCEDURE — 83690 ASSAY OF LIPASE: CPT

## 2025-07-24 PROCEDURE — 82550 ASSAY OF CK (CPK): CPT

## 2025-07-24 PROCEDURE — 3044F HG A1C LEVEL LT 7.0%: CPT | Mod: CPTII,S$GLB,, | Performed by: NURSE PRACTITIONER

## 2025-07-24 PROCEDURE — 81003 URINALYSIS AUTO W/O SCOPE: CPT

## 2025-07-24 PROCEDURE — 82040 ASSAY OF SERUM ALBUMIN: CPT

## 2025-07-24 NOTE — PROGRESS NOTES
"Patient ID: Desean Kent is a 40 y.o. male.    Chief Complaint: Chest Pain    History of Present Illness    Mr. Kent presents today with chest pain    He reports new intermittent chest pain occurring periodically throughout the day, described as a dull sensation or light flutter in the chest area. The pain began after initiating pravastatin. He experienced episodes similar to panic attacks with associated pain. He reports an episode of shortness of breath while sitting at work a few days ago, denying active exertion during this episode. He attributes the chest discomfort to potential medication side effects.    He has elevated lipids with high LDL cholesterol. He has been diagnosed with psoriatic arthritis affecting his knees, ankles, elbows, and occasionally fingers, with persistent joint pain. He previously received an injection for back spasms which was ineffective. He notes a previous steroid treatment resulted in an inflammatory reaction that temporarily prevented ambulation. No comprehensive testing for other systemic conditions like lupus or rheumatoid arthritis has been performed.    Father has documented statin intolerance, heart disease, high cholesterol, and multiple myeloma. Mother has heart disease and high cholesterol. He reports possible family history of rheumatoid arthritis.    He endorses new anxiety symptoms over the past two weeks, including feeling nervous, on edge several days, with moderate difficulty relaxing. He describes being easily annoyed and frequently irritated by others, characterizing himself as "misanthropic". These symptoms appear to coincide with recent medication changes and emotional distress following his wife's miscarriage in January/February. His workplace may offer counseling services, but he has not sought formal mental health support.    He takes pravastatin 10 mg for elevated lipids and daily supplements including vitamin B complex, vitamin B12, and " zinc.      ROS:  General: -fever, -chills, -fatigue, -weight gain, -weight loss  Eyes: -vision changes, -redness, -discharge  ENT: -ear pain, -nasal congestion, -sore throat  Cardiovascular: +chest pain, -palpitations, -lower extremity edema, +chest pain at rest, +feeling of flutter in chest  Respiratory: -cough, +shortness of breath, +dyspnea at rest  Gastrointestinal: -abdominal pain, -nausea, -vomiting, -diarrhea, -constipation, -blood in stool, +indigestion  Genitourinary: -dysuria, -hematuria, -frequency  Musculoskeletal: +joint pain, -muscle pain  Skin: -rash, -lesion  Neurological: -headache, -dizziness, -numbness, -tingling  Psychiatric: +anxiety, -depression, -sleep difficulty, +panic attacks, +irritability         Wt Readings from Last 10 Encounters:   07/24/25 70.4 kg (155 lb 3.3 oz)   05/20/25 68.8 kg (151 lb 9.1 oz)   04/07/25 67.9 kg (149 lb 11.1 oz)   04/16/24 71 kg (156 lb 8.4 oz)   09/22/22 63.2 kg (139 lb 5.3 oz)   09/21/22 64.9 kg (143 lb 1.3 oz)   09/19/22 64.9 kg (143 lb 1.3 oz)   08/22/22 63.3 kg (139 lb 8.8 oz)   08/16/22 62 kg (136 lb 11 oz)   12/16/19 66.3 kg (146 lb 2.6 oz)       The ASCVD Risk score ([place] DK, et al., 2019) failed to calculate for the following reasons:    The 2019 ASCVD risk score is only valid for ages 40 to 79    PAST MEDICAL HISTORY:  History reviewed. No pertinent past medical history.    PAST SURGICAL HISTORY:  History reviewed. No pertinent surgical history.    SOCIAL HISTORY:  Social History[1]    FAMILY HISTORY:  Family History   Problem Relation Name Age of Onset    Hypertension Mother Nevin     Hypertension Father Jd     Cancer Father Jd     Asthma Sister Cha     Diabetes Maternal Grandmother Donna        ALLERGIES AND MEDICATIONS: updated and reviewed.  Review of patient's allergies indicates:  No Known Allergies  Current Medications[2]      Physical Exam  Constitutional:       Appearance: Normal appearance.   HENT:      Head: Normocephalic and  atraumatic.      Right Ear: Tympanic membrane normal.      Left Ear: Tympanic membrane normal.      Nose: Nose normal.      Mouth/Throat:      Mouth: Mucous membranes are moist.   Eyes:      Pupils: Pupils are equal, round, and reactive to light.   Cardiovascular:      Rate and Rhythm: Normal rate.      Pulses: Normal pulses.   Pulmonary:      Effort: Pulmonary effort is normal.   Abdominal:      General: Bowel sounds are normal.   Musculoskeletal:         General: Normal range of motion.      Cervical back: Normal range of motion.   Skin:     General: Skin is warm and dry.      Capillary Refill: Capillary refill takes less than 2 seconds.   Neurological:      Mental Status: He is alert and oriented to person, place, and time.   Psychiatric:         Mood and Affect: Mood is anxious.          Assessment:   LABS:   Lab Results   Component Value Date    HGBA1C 5.5 04/07/2025    HGBA1C 5.4 04/16/2024      Lab Results   Component Value Date    CHOL 251 (H) 07/15/2025    CHOL 257 (H) 04/07/2025    CHOL 232 (H) 04/16/2024     Lab Results   Component Value Date    LDLCALC 183.0 (H) 07/15/2025    LDLCALC 173.6 (H) 04/07/2025    LDLCALC 144.0 04/16/2024     Lab Results   Component Value Date    WBC 7.34 07/24/2025    HGB 15.6 07/24/2025    HCT 46.8 07/24/2025     07/24/2025    CHOL 251 (H) 07/15/2025    TRIG 130 07/15/2025    HDL 42 07/15/2025    ALT 36 07/24/2025    AST 29 07/24/2025     07/24/2025    K 4.1 07/24/2025     07/24/2025    CREATININE 1.3 07/24/2025    BUN 14 07/24/2025    CO2 27 07/24/2025    TSH 1.382 04/07/2025    HGBA1C 5.5 04/07/2025       Plan:   Desean was seen today for chest pain.    Diagnoses and all orders for this visit:    Chest pain, unspecified type  -     Comprehensive Metabolic Panel; Future  -     CBC Auto Differential; Future  -     Troponin I; Future  -     CK; Future  -     Lipase; Future  -     Urinalysis; Future  -     Ambulatory referral/consult to Cardiology;  Future    NUSRAT (generalized anxiety disorder)    Mild anxiety    Family history of heart disease  -     Ambulatory referral/consult to Cardiology; Future    Dyslipidemia, goal LDL below 130  -     Ambulatory referral/consult to Cardiology; Future    Statin intolerance    Chronic arthritis    Grief associated with loss of fetus        Assessment & Plan      IMPRESSION:  -Considered anxiety and reflux as potential causes of chest pain, given age and symptoms.  -Diagnosed mild generalized anxiety disorder based on NUSRAT-7 screening results.  -Suspect statin intolerance may be causing reflux-like symptoms.  -Identified need for autoimmune workup due to reported joint pain and possible psoriatic arthritis diagnosis.  -Multiple cardiovascular risk factors: family history of heart disease, dyslipidemia, -American male approaching 40.    CHEST PAIN:  - Monitor patient's periodic chest pain occurring throughout the day, sometimes presenting with dull character and feeling like a panic attack.  - Note recent shortness of breath while sitting at work.  - Ordered EKG, chest XR, cardiac labs (troponin, CPK, lipase), complete blood count, and kidney and liver function tests to rule out cardiac causes.  - Explained how anxiety, reflux, and cardiac issues can present with similar symptoms.  - Referred to Cardiology at [facility] location for evaluation of chest pain and potential statin intolerance.    HYPERLIPIDEMIA:  - Monitor elevated lipids, specifically LDL.  - Discontinued pravastatin due to potential statin intolerance.  - Assessed for statin intolerance and will evaluate for alternative treatments, potentially switching to Repatha injections if intolerance is confirmed.  - Educated patient on statin intolerance and its potential side effects.  - Cardiology referral will address both chest pain and statin intolerance management.    ARTHRITIS:  - Monitor joint pain in knees, ankles, elbows, and fingers, primarily  occurring in the morning.  - Note previous clinical diagnosis of psoriatic arthritis with steroid treatment that caused inflammation.  - Plan to work up for autoimmune conditions.  - Referred to rheumatologist for further evaluation of arthritis and potential autoimmune conditions.    ANXIETY DISORDER:  - Mr. Kent reports feeling nervous, anxious, or on edge several days in the past 2 weeks, classified as mild anxiety based on NUSRAT-7 screening.  - Discussed anxiety screening, potential coping strategies, and how anxiety symptoms can mimic cardiac and reflux issues.  - Recommend regular exercise and utilizing employee assistance program for counseling services to manage anxiety.    FAMILY HISTORY OF HEART DISEASE:  - Mr. Kent has family history of heart disease in both parents.  - This history factored into cardiology referral decision.  - Explained importance of addressing cardiovascular risk factors early.    FAMILY HISTORY OF MULTIPLE MYELOMA:  - Mr. Kent's father has multiple myeloma.  - Acknowledged this family history.    STRESS FROM LIFE EVENTS:  - Mr. Kent and spouse experienced a miscarriage, which may be contributing to stress and anxiety.  - Discussed potential impact of grief on anxiety and physical health.  - Recommend utilizing workplace counseling services and offered referral to psychiatrist if needed.  - Suggested joining healthy social groups to improve social interactions.      FOLLOW-UP:  1. Non-fasting STAT labs at [facility] location.  2. EKG at [facility] today.  3. Assist with scheduling with Cardiology at earliest available at [facility] location.      I spent a total of 41 minutes face to face and non-face to face on the date of this visit.This includes time preparing to see the patient (eg, review of tests, notes), obtaining and/or reviewing additional history from an independent historian and/or outside medical records, documenting clinical information in the electronic health record,  independently interpreting results and/or communicating results to the patient/family/caregiver, or care coordinator.  Visit today included increased complexity associated with the care of the episodic problem addressed and managing the longitudinal care of the patient due to the serious and/or complex managed problem(s).      This note was generated with the assistance of ambient listening technology. Verbal consent was obtained by the patient and accompanying visitor(s) for the recording of patient appointment to facilitate this note. I attest to having reviewed and edited the generated note for accuracy, though some syntax or spelling errors may persist. Please contact the author of this note for any clarification.        Sincerely,  Osmin Davis NP          [1]   Social History  Socioeconomic History    Marital status: Single   Tobacco Use    Smoking status: Former     Current packs/day: 0.00     Average packs/day: 0.3 packs/day for 15.0 years (3.8 ttl pk-yrs)     Types: Cigarettes     Quit date: 2022     Years since quitting: 3.5     Passive exposure: Past    Smokeless tobacco: Never   Substance and Sexual Activity    Alcohol use: Yes    Drug use: Yes     Frequency: 3.0 times per week     Types: Marijuana     Comment: [employer]    Sexual activity: Yes     Partners: Female     Birth control/protection: Coitus interruptus     Social Drivers of Health     Financial Resource Strain: Low Risk  (5/19/2025)    Overall Financial Resource Strain (CARDIA)     Difficulty of Paying Living Expenses: Not hard at all   Food Insecurity: No Food Insecurity (5/19/2025)    Hunger Vital Sign     Worried About Running Out of Food in the Last Year: Never true     Ran Out of Food in the Last Year: Never true   Transportation Needs: No Transportation Needs (5/19/2025)    PRAPARE - Transportation     Lack of Transportation (Medical): No     Lack of Transportation (Non-Medical): No   Physical Activity: Insufficiently Active  (5/19/2025)    Exercise Vital Sign     Days of Exercise per Week: 3 days     Minutes of Exercise per Session: 30 min   Stress: No Stress Concern Present (5/19/2025)    [employer] [place] [employer] - Occupational Stress Questionnaire     Feeling of Stress : Not at all   Housing Stability: Low Risk  (5/19/2025)    Housing Stability Vital Sign     Unable to Pay for Housing in the Last Year: No     Number of Times Moved in the Last Year: 1     Homeless in the Last Year: No   [2]   Current Outpatient Medications   Medication Sig Dispense Refill    pravastatin (PRAVACHOL) 20 MG tablet Take 1 tablet (20 mg total) by mouth once daily. 90 tablet 3     No current facility-administered medications for this visit.

## 2025-07-28 ENCOUNTER — OFFICE VISIT (OUTPATIENT)
Dept: CARDIOLOGY | Facility: CLINIC | Age: 40
End: 2025-07-28
Payer: COMMERCIAL

## 2025-07-28 ENCOUNTER — LAB VISIT (OUTPATIENT)
Dept: LAB | Facility: HOSPITAL | Age: 40
End: 2025-07-28
Attending: INTERNAL MEDICINE
Payer: COMMERCIAL

## 2025-07-28 VITALS
OXYGEN SATURATION: 99 % | HEART RATE: 69 BPM | SYSTOLIC BLOOD PRESSURE: 140 MMHG | WEIGHT: 150.56 LBS | DIASTOLIC BLOOD PRESSURE: 70 MMHG | BODY MASS INDEX: 23.58 KG/M2

## 2025-07-28 DIAGNOSIS — E78.5 DYSLIPIDEMIA, GOAL LDL BELOW 130: ICD-10-CM

## 2025-07-28 DIAGNOSIS — Z87.891 EX-SMOKER: Primary | ICD-10-CM

## 2025-07-28 DIAGNOSIS — Z87.898 HISTORY OF ALCOHOL USE: ICD-10-CM

## 2025-07-28 DIAGNOSIS — R07.9 CHEST PAIN, UNSPECIFIED TYPE: ICD-10-CM

## 2025-07-28 DIAGNOSIS — Z82.49 FAMILY HISTORY OF HEART DISEASE: ICD-10-CM

## 2025-07-28 PROCEDURE — 36415 COLL VENOUS BLD VENIPUNCTURE: CPT

## 2025-07-28 PROCEDURE — 99999 PR PBB SHADOW E&M-EST. PATIENT-LVL III: CPT | Mod: PBBFAC,,, | Performed by: INTERNAL MEDICINE

## 2025-07-28 PROCEDURE — 83695 ASSAY OF LIPOPROTEIN(A): CPT

## 2025-07-28 NOTE — PROGRESS NOTES
Subjective:   Patient ID:  07/28/2025      Desean Kent is a 40 y.o. male who presents for evaluation of No chief complaint on file.      History of Present Illness    CHIEF COMPLAINT:  Patient presents today for follow up of elevated cholesterol and chest pain.    CHEST PAIN:  He reports chest pain initially presenting as acid reflux in the epigastric region, which later localized to the area of the heart with radiation from the center of the chest.    HYPERLIPIDEMIA:  He reports elevated cholesterol despite 2-3 months of dietary modifications including reduced red meat, grains, and pasta consumption, with increased vegetables and fish intake. He was subsequently started on pravastatin. He demonstrates motivation to improve cardiac health through dietary interventions and medication adherence.    FAMILY HISTORY:  He has a family history of cardiac disease, including HTN.    SOCIAL HISTORY:  Former smoker from age 22 to 37, smoking 1 pack per day. Previously consumed more than 10 beers weekly but stopped heavy drinking 3-4 years ago and has currently minimized alcohol consumption.         HPI    History reviewed. No pertinent past medical history.    History reviewed. No pertinent surgical history.    Social History[1]    Family History   Problem Relation Name Age of Onset    Hypertension Mother Nevin     Hypertension Father Jd     Cancer Father Jd     Asthma Sister Cha     Diabetes Maternal Grandmother Donna OSWALD    Current Outpatient Medications on File Prior to Visit   Medication Sig    pravastatin (PRAVACHOL) 20 MG tablet Take 1 tablet (20 mg total) by mouth once daily. (Patient not taking: Reported on 7/28/2025)     No current facility-administered medications on file prior to visit.       Objective:   Objective:  Wt Readings from Last 3 Encounters:   07/28/25 68.3 kg (150 lb 9.2 oz)   07/24/25 70.4 kg (155 lb 3.3 oz)   05/20/25 68.8 kg (151 lb 9.1 oz)     Temp Readings from Last 3  Encounters:   07/24/25 97.5 °F (36.4 °C) (Tympanic)   05/20/25 97.2 °F (36.2 °C) (Tympanic)   04/07/25 97.7 °F (36.5 °C) (Tympanic)     BP Readings from Last 3 Encounters:   07/28/25 (!) 140/70   07/24/25 128/76   05/20/25 122/76     Pulse Readings from Last 3 Encounters:   07/28/25 69   07/24/25 62   05/20/25 (!) 57       Physical Exam              Physical Exam      Lab Results   Component Value Date    CHOL 251 (H) 07/15/2025    CHOL 257 (H) 04/07/2025    CHOL 232 (H) 04/16/2024     Lab Results   Component Value Date    LDLCALC 183.0 (H) 07/15/2025    LDLCALC 173.6 (H) 04/07/2025    LDLCALC 144.0 04/16/2024         Chemistry        Component Value Date/Time     07/24/2025 1047     04/16/2024 1138    K 4.1 07/24/2025 1047    K 4.0 04/16/2024 1138     07/24/2025 1047     04/16/2024 1138    CO2 27 07/24/2025 1047    CO2 25 04/16/2024 1138    BUN 14 07/24/2025 1047    CREATININE 1.3 07/24/2025 1047    GLU 79 07/24/2025 1047    GLU 73 04/16/2024 1138        Component Value Date/Time    CALCIUM 9.7 07/24/2025 1047    CALCIUM 10.1 04/16/2024 1138    ALKPHOS 63 07/24/2025 1047    ALKPHOS 66 10/11/2024 0928    AST 29 07/24/2025 1047    AST 24 10/11/2024 0928    ALT 36 07/24/2025 1047    ALT 35 10/11/2024 0928    BILITOT 1.1 (H) 07/24/2025 1047    BILITOT 0.8 10/11/2024 0928          Lab Results   Component Value Date    TSH 1.382 04/07/2025       Lab Results   Component Value Date    WBC 7.34 07/24/2025    HGB 15.6 07/24/2025    HCT 46.8 07/24/2025    MCV 88 07/24/2025     07/24/2025       @LABRCNTIP(BNP,BNPTRIAGEBLO)@       Imaging:  ======    No results found for this or any previous visit.    No results found for this or any previous visit.    Results for orders placed during the hospital encounter of 06/01/16    X-Ray Chest PA And Lateral    Narrative  Comparison: None    Technique: PA and lateral views of the chest was obtained    Findings: The cardiac and mediastinal silhouettes are  within normal limits.   The lungs are clear bilaterally. Visualized osseous structures demonstrate no acute abnormality.  IMPRESSION:    No acute findings          ______________________________________    Electronically signed by: LOS TONY MD  Date:     06/01/16  Time:    11:11      No results found for this or any previous visit.      No valid procedures specified.        No results found for this or any previous visit.      No results found for this or any previous visit.      No results found for this or any previous visit.      Lab Results   Component Value Date    HGBA1C 5.5 04/07/2025         The ASCVD Risk score (Araceli ROSSI, et al., 2019) failed to calculate for the following reasons:    The 2019 ASCVD risk score is only valid for ages 40 to 79    Diagnostic Results:  ECG: Reviewed    Assessment and Plan:   Ex-smoker    Chest pain, unspecified type  -     Ambulatory referral/consult to Cardiology    Family history of heart disease  -     Ambulatory referral/consult to Cardiology  -     Exercise Stress - EKG; Future  -     Lipoprotein A (LPA); Future; Expected date: 07/28/2025    Dyslipidemia, goal LDL below 130  -     Ambulatory referral/consult to Cardiology    History of alcohol use        Assessment & Plan    R07.9 Chest pain, unspecified type  Z82.49 Family history of heart disease  E78.5 Dyslipidemia, goal LDL below 130  Z87.891 Ex-smoker  Z87.898 History of alcohol use    Assessed elevated cholesterol and family history of heart disease.  CPK level (269, normal up to 200) not clinically significant to warrant statin discontinuation.  Corner chest pain likely due to pulled muscle from work involving pulling and pushing.    R07.9 CHEST PAIN, UNSPECIFIED TYPE:  - Ordered stress test.    Z82.49 FAMILY HISTORY OF HEART DISEASE:  - Informed patient about the significance of lipoprotein A test in assessing heart disease risk.  - Ordered lipoprotein A test.    E78.5 DYSLIPIDEMIA, GOAL LDL BELOW 130:  -  Patient to cut down on red meat consumption, reduce dairy intake, increase consumption of fish and lean chicken, continue with current vegetable intake, and avoid fried foods.  - Restarted pravastatin at previous dose.  - Ordered repeat labs.             Healthy weight goals were discussed in clinic  Reviewed all tests and above medical conditions with patient in detail and formulated treatment plan.      F/u 6 months       [1]   Social History  Tobacco Use    Smoking status: Former     Current packs/day: 0.00     Average packs/day: 0.3 packs/day for 15.0 years (3.8 ttl pk-yrs)     Types: Cigarettes     Quit date: 2022     Years since quitting: 3.5     Passive exposure: Past    Smokeless tobacco: Never   Substance Use Topics    Alcohol use: Yes    Drug use: Yes     Frequency: 3.0 times per week     Types: Marijuana     Comment: [employer]

## 2025-07-31 ENCOUNTER — TELEPHONE (OUTPATIENT)
Dept: CARDIOLOGY | Facility: CLINIC | Age: 40
End: 2025-07-31
Payer: COMMERCIAL

## 2025-07-31 LAB — W LP(A): 71 NMOL/L

## 2025-08-06 ENCOUNTER — HOSPITAL ENCOUNTER (OUTPATIENT)
Dept: CARDIOLOGY | Facility: HOSPITAL | Age: 40
Discharge: HOME | End: 2025-08-06
Attending: INTERNAL MEDICINE
Payer: COMMERCIAL

## 2025-08-06 ENCOUNTER — TELEPHONE (OUTPATIENT)
Dept: PRIMARY CARE CLINIC | Facility: CLINIC | Age: 40
End: 2025-08-06
Payer: COMMERCIAL

## 2025-08-06 DIAGNOSIS — Z82.49 FAMILY HISTORY OF HEART DISEASE: ICD-10-CM

## 2025-08-06 DIAGNOSIS — R07.9 CHEST PAIN, UNSPECIFIED TYPE: Primary | ICD-10-CM

## 2025-08-06 PROCEDURE — 93016 CV STRESS TEST SUPVJ ONLY: CPT | Mod: ,,, | Performed by: INTERNAL MEDICINE

## 2025-08-06 PROCEDURE — 93017 CV STRESS TEST TRACING ONLY: CPT

## 2025-08-06 PROCEDURE — 93018 CV STRESS TEST I&R ONLY: CPT | Mod: ,,, | Performed by: INTERNAL MEDICINE

## 2025-08-07 LAB
CV STRESS BASE HR: 59 BPM
DIASTOLIC BLOOD PRESSURE: 82 MMHG
OHS CV CPX 85 PERCENT MAX PREDICTED HEART RATE MALE: 154
OHS CV CPX ESTIMATED METS: 8
OHS CV CPX MAX PREDICTED HEART RATE: 181
OHS CV CPX PATIENT IS FEMALE: 0
OHS CV CPX PATIENT IS MALE: 1
OHS CV CPX PEAK DIASTOLIC BLOOD PRESSURE: 77 MMHG
OHS CV CPX PEAK HEAR RATE: 155 BPM
OHS CV CPX PEAK RATE PRESSURE PRODUCT: NORMAL
OHS CV CPX PEAK SYSTOLIC BLOOD PRESSURE: 188 MMHG
OHS CV CPX PERCENT MAX PREDICTED HEART RATE ACHIEVED: 86
OHS CV CPX RATE PRESSURE PRODUCT PRESENTING: 7611
STRESS ECHO POST EXERCISE DUR MIN: 6 MINUTES
STRESS ECHO POST EXERCISE DUR SEC: 54 SECONDS
SYSTOLIC BLOOD PRESSURE: 129 MMHG